# Patient Record
Sex: MALE | Race: WHITE | NOT HISPANIC OR LATINO | URBAN - METROPOLITAN AREA
[De-identification: names, ages, dates, MRNs, and addresses within clinical notes are randomized per-mention and may not be internally consistent; named-entity substitution may affect disease eponyms.]

---

## 2017-09-10 ENCOUNTER — EMERGENCY (EMERGENCY)
Facility: HOSPITAL | Age: 52
LOS: 0 days | Discharge: HOME | End: 2017-09-10

## 2017-09-10 DIAGNOSIS — Z87.442 PERSONAL HISTORY OF URINARY CALCULI: ICD-10-CM

## 2017-09-10 DIAGNOSIS — Z79.899 OTHER LONG TERM (CURRENT) DRUG THERAPY: ICD-10-CM

## 2017-09-10 DIAGNOSIS — N20.0 CALCULUS OF KIDNEY: ICD-10-CM

## 2017-09-10 DIAGNOSIS — F32.9 MAJOR DEPRESSIVE DISORDER, SINGLE EPISODE, UNSPECIFIED: ICD-10-CM

## 2017-09-10 DIAGNOSIS — N23 UNSPECIFIED RENAL COLIC: ICD-10-CM

## 2017-09-10 DIAGNOSIS — Z90.49 ACQUIRED ABSENCE OF OTHER SPECIFIED PARTS OF DIGESTIVE TRACT: ICD-10-CM

## 2017-09-10 DIAGNOSIS — Z90.5 ACQUIRED ABSENCE OF KIDNEY: ICD-10-CM

## 2017-09-10 DIAGNOSIS — R10.9 UNSPECIFIED ABDOMINAL PAIN: ICD-10-CM

## 2017-09-10 DIAGNOSIS — D49.519 NEOPLASM OF UNSPECIFIED BEHAVIOR OF UNSPECIFIED KIDNEY: ICD-10-CM

## 2017-10-03 ENCOUNTER — INPATIENT (INPATIENT)
Facility: HOSPITAL | Age: 52
LOS: 0 days | Discharge: HOME | End: 2017-10-04
Attending: UROLOGY | Admitting: FAMILY MEDICINE

## 2017-10-03 DIAGNOSIS — N23 UNSPECIFIED RENAL COLIC: ICD-10-CM

## 2017-10-03 DIAGNOSIS — D49.519 NEOPLASM OF UNSPECIFIED BEHAVIOR OF UNSPECIFIED KIDNEY: ICD-10-CM

## 2017-10-06 DIAGNOSIS — Z85.528 PERSONAL HISTORY OF OTHER MALIGNANT NEOPLASM OF KIDNEY: ICD-10-CM

## 2017-10-06 DIAGNOSIS — G47.33 OBSTRUCTIVE SLEEP APNEA (ADULT) (PEDIATRIC): ICD-10-CM

## 2017-10-06 DIAGNOSIS — N20.0 CALCULUS OF KIDNEY: ICD-10-CM

## 2017-10-06 DIAGNOSIS — N13.2 HYDRONEPHROSIS WITH RENAL AND URETERAL CALCULOUS OBSTRUCTION: ICD-10-CM

## 2017-10-06 DIAGNOSIS — F41.9 ANXIETY DISORDER, UNSPECIFIED: ICD-10-CM

## 2017-10-06 PROBLEM — Z00.00 ENCOUNTER FOR PREVENTIVE HEALTH EXAMINATION: Status: ACTIVE | Noted: 2017-10-06

## 2017-10-09 ENCOUNTER — APPOINTMENT (OUTPATIENT)
Dept: UROLOGY | Facility: CLINIC | Age: 52
End: 2017-10-09
Payer: COMMERCIAL

## 2017-10-09 VITALS
BODY MASS INDEX: 34.13 KG/M2 | DIASTOLIC BLOOD PRESSURE: 75 MMHG | WEIGHT: 295 LBS | SYSTOLIC BLOOD PRESSURE: 117 MMHG | HEART RATE: 73 BPM | HEIGHT: 78 IN

## 2017-10-09 DIAGNOSIS — Z83.3 FAMILY HISTORY OF DIABETES MELLITUS: ICD-10-CM

## 2017-10-09 DIAGNOSIS — Z80.3 FAMILY HISTORY OF MALIGNANT NEOPLASM OF BREAST: ICD-10-CM

## 2017-10-09 DIAGNOSIS — Z78.9 OTHER SPECIFIED HEALTH STATUS: ICD-10-CM

## 2017-10-09 PROCEDURE — 99213 OFFICE O/P EST LOW 20 MIN: CPT

## 2017-12-12 ENCOUNTER — APPOINTMENT (OUTPATIENT)
Dept: UROLOGY | Facility: CLINIC | Age: 52
End: 2017-12-12
Payer: COMMERCIAL

## 2017-12-12 VITALS
WEIGHT: 295 LBS | HEART RATE: 71 BPM | DIASTOLIC BLOOD PRESSURE: 86 MMHG | HEIGHT: 78 IN | BODY MASS INDEX: 34.13 KG/M2 | SYSTOLIC BLOOD PRESSURE: 137 MMHG

## 2017-12-12 PROCEDURE — 99213 OFFICE O/P EST LOW 20 MIN: CPT

## 2017-12-19 ENCOUNTER — APPOINTMENT (OUTPATIENT)
Dept: UROLOGY | Facility: CLINIC | Age: 52
End: 2017-12-19

## 2018-01-02 ENCOUNTER — OUTPATIENT (OUTPATIENT)
Dept: OUTPATIENT SERVICES | Facility: HOSPITAL | Age: 53
LOS: 1 days | Discharge: HOME | End: 2018-01-02

## 2018-01-02 DIAGNOSIS — N23 UNSPECIFIED RENAL COLIC: ICD-10-CM

## 2018-01-02 DIAGNOSIS — D49.519 NEOPLASM OF UNSPECIFIED BEHAVIOR OF UNSPECIFIED KIDNEY: ICD-10-CM

## 2018-01-02 DIAGNOSIS — C64.9 MALIGNANT NEOPLASM OF UNSPECIFIED KIDNEY, EXCEPT RENAL PELVIS: ICD-10-CM

## 2018-01-23 LAB
ANION GAP SERPL CALC-SCNC: 7 MEQ/L
BUN SERPL-MCNC: 14 MG/DL
BUN/CREAT SERPL: 10.7 %
CALCIUM SERPL-MCNC: 9.7 MG/DL
CHLORIDE SERPL-SCNC: 106 MEQ/L
CO2 SERPL-SCNC: 28 MEQ/L
CREAT SERPL-MCNC: 1.31 MG/DL
GFR SERPL CREATININE-BSD FRML MDRD: 58
GLUCOSE SERPL-MCNC: 115 MG/DL
POTASSIUM SERPL-SCNC: 4.9 MMOL/L
SODIUM SERPL-SCNC: 141 MEQ/L

## 2018-02-05 ENCOUNTER — OTHER (OUTPATIENT)
Age: 53
End: 2018-02-05

## 2018-05-04 ENCOUNTER — APPOINTMENT (OUTPATIENT)
Dept: UROLOGY | Facility: CLINIC | Age: 53
End: 2018-05-04

## 2018-12-27 ENCOUNTER — OTHER (OUTPATIENT)
Age: 53
End: 2018-12-27

## 2018-12-27 ENCOUNTER — INPATIENT (INPATIENT)
Facility: HOSPITAL | Age: 53
LOS: 0 days | Discharge: HOME | End: 2018-12-28
Attending: SURGERY | Admitting: SURGERY
Payer: COMMERCIAL

## 2018-12-27 VITALS
DIASTOLIC BLOOD PRESSURE: 87 MMHG | HEIGHT: 78 IN | WEIGHT: 294.98 LBS | HEART RATE: 70 BPM | SYSTOLIC BLOOD PRESSURE: 169 MMHG | OXYGEN SATURATION: 97 % | RESPIRATION RATE: 18 BRPM | TEMPERATURE: 96 F

## 2018-12-27 DIAGNOSIS — Z90.5 ACQUIRED ABSENCE OF KIDNEY: Chronic | ICD-10-CM

## 2018-12-27 DIAGNOSIS — Z90.49 ACQUIRED ABSENCE OF OTHER SPECIFIED PARTS OF DIGESTIVE TRACT: Chronic | ICD-10-CM

## 2018-12-27 DIAGNOSIS — Z98.890 OTHER SPECIFIED POSTPROCEDURAL STATES: Chronic | ICD-10-CM

## 2018-12-27 DIAGNOSIS — N20.0 CALCULUS OF KIDNEY: Chronic | ICD-10-CM

## 2018-12-27 DIAGNOSIS — Z85.528 PERSONAL HISTORY OF OTHER MALIGNANT NEOPLASM OF KIDNEY: Chronic | ICD-10-CM

## 2018-12-27 LAB
ALBUMIN SERPL ELPH-MCNC: 4.3 G/DL — SIGNIFICANT CHANGE UP (ref 3.5–5.2)
ALP SERPL-CCNC: 65 U/L — SIGNIFICANT CHANGE UP (ref 30–115)
ALT FLD-CCNC: 24 U/L — SIGNIFICANT CHANGE UP (ref 0–41)
ANION GAP SERPL CALC-SCNC: 11 MMOL/L — SIGNIFICANT CHANGE UP (ref 7–14)
APPEARANCE UR: CLEAR — SIGNIFICANT CHANGE UP
AST SERPL-CCNC: 18 U/L — SIGNIFICANT CHANGE UP (ref 0–41)
BASOPHILS # BLD AUTO: 0.03 K/UL — SIGNIFICANT CHANGE UP (ref 0–0.2)
BASOPHILS NFR BLD AUTO: 0.4 % — SIGNIFICANT CHANGE UP (ref 0–1)
BILIRUB DIRECT SERPL-MCNC: <0.2 MG/DL — SIGNIFICANT CHANGE UP (ref 0–0.2)
BILIRUB INDIRECT FLD-MCNC: >0.5 MG/DL — SIGNIFICANT CHANGE UP (ref 0.2–1.2)
BILIRUB SERPL-MCNC: 0.7 MG/DL — SIGNIFICANT CHANGE UP (ref 0.2–1.2)
BILIRUB UR-MCNC: NEGATIVE — SIGNIFICANT CHANGE UP
BUN SERPL-MCNC: 32 MG/DL — HIGH (ref 10–20)
CALCIUM SERPL-MCNC: 9.4 MG/DL — SIGNIFICANT CHANGE UP (ref 8.5–10.1)
CHLORIDE SERPL-SCNC: 104 MMOL/L — SIGNIFICANT CHANGE UP (ref 98–110)
CO2 SERPL-SCNC: 27 MMOL/L — SIGNIFICANT CHANGE UP (ref 17–32)
COLOR SPEC: YELLOW — SIGNIFICANT CHANGE UP
CREAT SERPL-MCNC: 2.3 MG/DL — HIGH (ref 0.7–1.5)
DIFF PNL FLD: ABNORMAL
EOSINOPHIL # BLD AUTO: 0.16 K/UL — SIGNIFICANT CHANGE UP (ref 0–0.7)
EOSINOPHIL NFR BLD AUTO: 1.9 % — SIGNIFICANT CHANGE UP (ref 0–8)
GLUCOSE SERPL-MCNC: 175 MG/DL — HIGH (ref 70–99)
GLUCOSE UR QL: NEGATIVE MG/DL — SIGNIFICANT CHANGE UP
HCT VFR BLD CALC: 43.4 % — SIGNIFICANT CHANGE UP (ref 42–52)
HGB BLD-MCNC: 14.5 G/DL — SIGNIFICANT CHANGE UP (ref 14–18)
IMM GRANULOCYTES NFR BLD AUTO: 0.2 % — SIGNIFICANT CHANGE UP (ref 0.1–0.3)
KETONES UR-MCNC: NEGATIVE — SIGNIFICANT CHANGE UP
LACTATE SERPL-SCNC: 0.9 MMOL/L — SIGNIFICANT CHANGE UP (ref 0.5–2.2)
LEUKOCYTE ESTERASE UR-ACNC: NEGATIVE — SIGNIFICANT CHANGE UP
LIDOCAIN IGE QN: 26 U/L — SIGNIFICANT CHANGE UP (ref 7–60)
LYMPHOCYTES # BLD AUTO: 1.71 K/UL — SIGNIFICANT CHANGE UP (ref 1.2–3.4)
LYMPHOCYTES # BLD AUTO: 20.6 % — SIGNIFICANT CHANGE UP (ref 20.5–51.1)
MCHC RBC-ENTMCNC: 29 PG — SIGNIFICANT CHANGE UP (ref 27–31)
MCHC RBC-ENTMCNC: 33.4 G/DL — SIGNIFICANT CHANGE UP (ref 32–37)
MCV RBC AUTO: 86.8 FL — SIGNIFICANT CHANGE UP (ref 80–94)
MONOCYTES # BLD AUTO: 0.85 K/UL — HIGH (ref 0.1–0.6)
MONOCYTES NFR BLD AUTO: 10.2 % — HIGH (ref 1.7–9.3)
NEUTROPHILS # BLD AUTO: 5.55 K/UL — SIGNIFICANT CHANGE UP (ref 1.4–6.5)
NEUTROPHILS NFR BLD AUTO: 66.7 % — SIGNIFICANT CHANGE UP (ref 42.2–75.2)
NITRITE UR-MCNC: NEGATIVE — SIGNIFICANT CHANGE UP
PH UR: 6 — SIGNIFICANT CHANGE UP (ref 5–8)
PLATELET # BLD AUTO: 201 K/UL — SIGNIFICANT CHANGE UP (ref 130–400)
POTASSIUM SERPL-MCNC: 4.3 MMOL/L — SIGNIFICANT CHANGE UP (ref 3.5–5)
POTASSIUM SERPL-SCNC: 4.3 MMOL/L — SIGNIFICANT CHANGE UP (ref 3.5–5)
PROT SERPL-MCNC: 7.3 G/DL — SIGNIFICANT CHANGE UP (ref 6–8)
PROT UR-MCNC: 30 MG/DL
RBC # BLD: 5 M/UL — SIGNIFICANT CHANGE UP (ref 4.7–6.1)
RBC # FLD: 12.5 % — SIGNIFICANT CHANGE UP (ref 11.5–14.5)
SODIUM SERPL-SCNC: 142 MMOL/L — SIGNIFICANT CHANGE UP (ref 135–146)
SP GR SPEC: >=1.03 (ref 1.01–1.03)
UROBILINOGEN FLD QL: 1 MG/DL (ref 0.2–0.2)
WBC # BLD: 8.32 K/UL — SIGNIFICANT CHANGE UP (ref 4.8–10.8)
WBC # FLD AUTO: 8.32 K/UL — SIGNIFICANT CHANGE UP (ref 4.8–10.8)

## 2018-12-27 PROCEDURE — 99222 1ST HOSP IP/OBS MODERATE 55: CPT | Mod: 25

## 2018-12-27 PROCEDURE — 52332 CYSTOSCOPY AND TREATMENT: CPT | Mod: LT

## 2018-12-27 RX ORDER — TAMSULOSIN HYDROCHLORIDE 0.4 MG/1
0.4 CAPSULE ORAL AT BEDTIME
Qty: 0 | Refills: 0 | Status: DISCONTINUED | OUTPATIENT
Start: 2018-12-27 | End: 2018-12-28

## 2018-12-27 RX ORDER — CEFAZOLIN SODIUM 1 G
1000 VIAL (EA) INJECTION EVERY 8 HOURS
Qty: 0 | Refills: 0 | Status: DISCONTINUED | OUTPATIENT
Start: 2018-12-27 | End: 2018-12-28

## 2018-12-27 RX ORDER — HYDROMORPHONE HYDROCHLORIDE 2 MG/ML
0.5 INJECTION INTRAMUSCULAR; INTRAVENOUS; SUBCUTANEOUS EVERY 4 HOURS
Qty: 0 | Refills: 0 | Status: DISCONTINUED | OUTPATIENT
Start: 2018-12-27 | End: 2018-12-27

## 2018-12-27 RX ORDER — HYDROMORPHONE HYDROCHLORIDE 2 MG/ML
0.5 INJECTION INTRAMUSCULAR; INTRAVENOUS; SUBCUTANEOUS ONCE
Qty: 0 | Refills: 0 | Status: DISCONTINUED | OUTPATIENT
Start: 2018-12-27 | End: 2018-12-27

## 2018-12-27 RX ORDER — SODIUM CHLORIDE 9 MG/ML
1000 INJECTION, SOLUTION INTRAVENOUS
Qty: 0 | Refills: 0 | Status: DISCONTINUED | OUTPATIENT
Start: 2018-12-27 | End: 2018-12-27

## 2018-12-27 RX ORDER — HYDROMORPHONE HYDROCHLORIDE 2 MG/ML
0.5 INJECTION INTRAMUSCULAR; INTRAVENOUS; SUBCUTANEOUS
Qty: 0 | Refills: 0 | Status: DISCONTINUED | OUTPATIENT
Start: 2018-12-27 | End: 2018-12-27

## 2018-12-27 RX ORDER — KETOROLAC TROMETHAMINE 30 MG/ML
15 SYRINGE (ML) INJECTION EVERY 6 HOURS
Qty: 0 | Refills: 0 | Status: DISCONTINUED | OUTPATIENT
Start: 2018-12-27 | End: 2018-12-28

## 2018-12-27 RX ORDER — HEPARIN SODIUM 5000 [USP'U]/ML
5000 INJECTION INTRAVENOUS; SUBCUTANEOUS EVERY 8 HOURS
Qty: 0 | Refills: 0 | Status: DISCONTINUED | OUTPATIENT
Start: 2018-12-27 | End: 2018-12-28

## 2018-12-27 RX ORDER — ONDANSETRON 8 MG/1
4 TABLET, FILM COATED ORAL ONCE
Qty: 0 | Refills: 0 | Status: DISCONTINUED | OUTPATIENT
Start: 2018-12-27 | End: 2018-12-27

## 2018-12-27 RX ORDER — MORPHINE SULFATE 50 MG/1
4 CAPSULE, EXTENDED RELEASE ORAL ONCE
Qty: 0 | Refills: 0 | Status: DISCONTINUED | OUTPATIENT
Start: 2018-12-27 | End: 2018-12-27

## 2018-12-27 RX ORDER — SODIUM CHLORIDE 9 MG/ML
2000 INJECTION, SOLUTION INTRAVENOUS ONCE
Qty: 0 | Refills: 0 | Status: COMPLETED | OUTPATIENT
Start: 2018-12-27 | End: 2018-12-27

## 2018-12-27 RX ORDER — ONDANSETRON 8 MG/1
4 TABLET, FILM COATED ORAL EVERY 6 HOURS
Qty: 0 | Refills: 0 | Status: DISCONTINUED | OUTPATIENT
Start: 2018-12-27 | End: 2018-12-27

## 2018-12-27 RX ORDER — SODIUM CHLORIDE 9 MG/ML
1000 INJECTION INTRAMUSCULAR; INTRAVENOUS; SUBCUTANEOUS
Qty: 0 | Refills: 0 | Status: DISCONTINUED | OUTPATIENT
Start: 2018-12-27 | End: 2018-12-27

## 2018-12-27 RX ORDER — ONDANSETRON 8 MG/1
4 TABLET, FILM COATED ORAL ONCE
Qty: 0 | Refills: 0 | Status: COMPLETED | OUTPATIENT
Start: 2018-12-27 | End: 2018-12-27

## 2018-12-27 RX ORDER — ACETAMINOPHEN 500 MG
650 TABLET ORAL EVERY 6 HOURS
Qty: 0 | Refills: 0 | Status: DISCONTINUED | OUTPATIENT
Start: 2018-12-27 | End: 2018-12-28

## 2018-12-27 RX ADMIN — SODIUM CHLORIDE 2000 MILLILITER(S): 9 INJECTION, SOLUTION INTRAVENOUS at 09:35

## 2018-12-27 RX ADMIN — SODIUM CHLORIDE 125 MILLILITER(S): 9 INJECTION INTRAMUSCULAR; INTRAVENOUS; SUBCUTANEOUS at 15:41

## 2018-12-27 RX ADMIN — Medication 100 MILLIGRAM(S): at 23:02

## 2018-12-27 RX ADMIN — TAMSULOSIN HYDROCHLORIDE 0.4 MILLIGRAM(S): 0.4 CAPSULE ORAL at 23:28

## 2018-12-27 RX ADMIN — ONDANSETRON 4 MILLIGRAM(S): 8 TABLET, FILM COATED ORAL at 09:35

## 2018-12-27 RX ADMIN — MORPHINE SULFATE 4 MILLIGRAM(S): 50 CAPSULE, EXTENDED RELEASE ORAL at 09:35

## 2018-12-27 RX ADMIN — HYDROMORPHONE HYDROCHLORIDE 0.5 MILLIGRAM(S): 2 INJECTION INTRAMUSCULAR; INTRAVENOUS; SUBCUTANEOUS at 15:40

## 2018-12-27 RX ADMIN — HEPARIN SODIUM 5000 UNIT(S): 5000 INJECTION INTRAVENOUS; SUBCUTANEOUS at 23:01

## 2018-12-27 RX ADMIN — SODIUM CHLORIDE 125 MILLILITER(S): 9 INJECTION INTRAMUSCULAR; INTRAVENOUS; SUBCUTANEOUS at 13:37

## 2018-12-27 RX ADMIN — HYDROMORPHONE HYDROCHLORIDE 0.5 MILLIGRAM(S): 2 INJECTION INTRAMUSCULAR; INTRAVENOUS; SUBCUTANEOUS at 14:41

## 2018-12-27 RX ADMIN — HYDROMORPHONE HYDROCHLORIDE 0.5 MILLIGRAM(S): 2 INJECTION INTRAMUSCULAR; INTRAVENOUS; SUBCUTANEOUS at 13:36

## 2018-12-27 RX ADMIN — SODIUM CHLORIDE 100 MILLILITER(S): 9 INJECTION, SOLUTION INTRAVENOUS at 18:56

## 2018-12-27 NOTE — H&P ADULT - ATTENDING COMMENTS
pt seen examined and x ray reviewed  larget left ureteral stone burden with advil 2 days ago  we will need to do w/u after d/c to try and avoid further stones pt seen examined and x ray reviewed  large (vs steinstrasse) left ureteral stone burden with advil 2 days ago  we will need to do w/u after d/c to try and avoid further stones

## 2018-12-27 NOTE — ED PROVIDER NOTE - MEDICAL DECISION MAKING DETAILS
53y male above PMH with left flank pain similar to prior colic, no fever, +chills with pain, one xam vital signs appreciated, well appearing, abd snt/nd, +mild left CVAT, labs and studies reviewed and d/w urology, will admit, stable for floor

## 2018-12-27 NOTE — H&P ADULT - NSHPPHYSICALEXAM_GEN_ALL_CORE
gen: non-toxic appearing  lungs; cta  cvs: s1s2  abd: soft, (+) left flank/LLQ tenderness  ext: no edema LE

## 2018-12-27 NOTE — H&P ADULT - PSH
H/O bilateral inguinal hernia repair    H/O partial nephrectomy  right  History of appendectomy    History of kidney cancer    Kidney stones

## 2018-12-27 NOTE — ED PROVIDER NOTE - NS ED ROS FT
Constitutional: No fevers/chills.    Head: No injury, headache.    Eyes:  No visual changes, eye pain or discharge. No injury.    ENMT:  No hearing changes, pain, discharge or infections. No neck pain or stiffness. No loss ROM.    Cardiac:  No chest pain, SOB or edema. No chest pain with exertion.    Respiratory:  No cough or respiratory distress.     GI:  No nausea, vomiting, diarrhea (+) abdominal pain. No anorexia. No change in PO intake.    :  (+) frequency, (-) difficulty urinating, urgency or burning. No change in urine output.    MS:  No leg pain, leg swelling, myalgia, muscle weakness, joint pain (+) left sided flank pain. No loss ROM.    Neuro:  No dizziness or weakness.  No LOC.    Skin:  No skin rash.

## 2018-12-27 NOTE — ED ADULT TRIAGE NOTE - CHIEF COMPLAINT QUOTE
Patient complaining of left lower flank pain that radiates to left side of abdomen 8/10 that started sunday.

## 2018-12-27 NOTE — H&P ADULT - NSHPLABSRESULTS_GEN_ALL_CORE
14.5   8.32  )-----------( 201      ( 27 Dec 2018 10:17 )             43.4       Auto Neutrophil %: 66.7 % (12-27-18 @ 10:17)  Auto Immature Granulocyte %: 0.2 % (12-27-18 @ 10:17)    12-27    142  |  104  |  32<H>  ----------------------------<  175<H>  4.3   |  27  |  2.3<H>      Calcium, Total Serum: 9.4 mg/dL (12-27-18 @ 10:17)      LFTs:             7.3  | 0.7  | 18       ------------------[65      ( 27 Dec 2018 10:17 )  4.3  | <0.2 | 24          Lipase:26     Amylase:x         Lactate, Blood: 0.9 mmol/L (12-27-18 @ 10:17)      Coags:        Urinalysis Basic - ( 27 Dec 2018 10:17 )    Color: Yellow / Appearance: Clear / SG: >=1.030 / pH: x  Gluc: x / Ketone: Negative  / Bili: Negative / Urobili: 1.0 mg/dL   Blood: x / Protein: 30 mg/dL / Nitrite: Negative   Leuk Esterase: Negative / RBC: 3-5 /HPF / WBC Negative   Sq Epi: x / Non Sq Epi: Few /HPF / Bacteria: x      RADIOLOGY:   CT Abdomen and Pelvis No Cont (12.27.18 @ 11:00) >      Moderate left hydronephrosis secondary to multiple stacked obstructing   calculi in the proximal left ureter, the largest individual calculus   measuring 10 x 5 mm.

## 2018-12-27 NOTE — BRIEF OPERATIVE NOTE - POST-OP DX
Hydronephrosis concurrent with and due to calculi of kidney and ureter  12/27/2018  left  Active  Antwon Rivers  Left ureteral calculus  12/27/2018    Active  Antwon Rivers  Pyonephrosis  12/27/2018  left  Active  Antwon Rivers  Renal calculus, left  12/27/2018    Active  Antwon Rivers

## 2018-12-27 NOTE — ED PROVIDER NOTE - OBJECTIVE STATEMENT
52 yo male with PMHx RCC s/p partial nephrectomy, kidney stones needing stents presents for left flank pain x 5 days. Patient states pain began in left flank now wrapping around to left groin. Patient denies fevers, chest pain, SOB, nausea, vomiting, diarrhea, constipation, dizziness, weakness, changes in PO intake, changes in urine output, changes in mental status.

## 2018-12-27 NOTE — H&P ADULT - HISTORY OF PRESENT ILLNESS
53 yr old male with c/o left flank pain, on & off, x 2 days. Denies fevers, chills, N/V, hematuria. He has hx of RCC, s/p partial right nephrectomy 3 yrs ago with Dr. Baca. CT A/P shows multiple left ureteral stones with hydronpehrosis and MAG (creat = 2.3) 53 yr old male with c/o left flank pain, on & off, x 2 days. Denies fevers, chills, N/V, hematuria. He has hx of RCC, s/p partial right nephrectomy 3 yrs ago with Dr. Baca and treatment of stones x at least 2. CT A/P shows multiple left ureteral stones, almost a steinstrasse with hydronpehrosis and MAG (creat = 2.3)

## 2018-12-27 NOTE — H&P ADULT - ASSESSMENT
53 yr old male with left flank pain x 2 days    1. MUltiple obstructing left ureteral calculi with moderate hydronpehrosis and MAG    Case D/W Dr. Rivers:   - Pt for OR today   - Keep NPO, IV fluids, pain management   - No antibiotics at this time   - strain urine

## 2018-12-27 NOTE — BRIEF OPERATIVE NOTE - PRE-OP DX
Hydronephrosis concurrent with and due to calculi of kidney and ureter  12/27/2018  left  Active  Antwon Rivers  Left ureteral calculus  12/27/2018    Active  Antwon Rivers  Renal calculus, left  12/27/2018    Active  Antwon Rivers

## 2018-12-27 NOTE — BRIEF OPERATIVE NOTE - PROCEDURE
<<-----Click on this checkbox to enter Procedure Ureteral stent placement, intraoperatively (not at tx)  12/27/2018  left  Active  ELDA  Cystoscopy with left retrograde pyelography  12/27/2018    Active  ELDA

## 2018-12-28 ENCOUNTER — TRANSCRIPTION ENCOUNTER (OUTPATIENT)
Age: 53
End: 2018-12-28

## 2018-12-28 VITALS
TEMPERATURE: 98 F | HEART RATE: 88 BPM | DIASTOLIC BLOOD PRESSURE: 71 MMHG | RESPIRATION RATE: 16 BRPM | SYSTOLIC BLOOD PRESSURE: 157 MMHG

## 2018-12-28 LAB
ANION GAP SERPL CALC-SCNC: 7 MMOL/L — SIGNIFICANT CHANGE UP (ref 7–14)
BASOPHILS # BLD AUTO: 0.01 K/UL — SIGNIFICANT CHANGE UP (ref 0–0.2)
BASOPHILS NFR BLD AUTO: 0.1 % — SIGNIFICANT CHANGE UP (ref 0–1)
BUN SERPL-MCNC: 28 MG/DL — HIGH (ref 10–20)
CALCIUM SERPL-MCNC: 9.1 MG/DL — SIGNIFICANT CHANGE UP (ref 8.5–10.1)
CHLORIDE SERPL-SCNC: 102 MMOL/L — SIGNIFICANT CHANGE UP (ref 98–110)
CO2 SERPL-SCNC: 29 MMOL/L — SIGNIFICANT CHANGE UP (ref 17–32)
CREAT SERPL-MCNC: 1.8 MG/DL — HIGH (ref 0.7–1.5)
CULTURE RESULTS: NO GROWTH — SIGNIFICANT CHANGE UP
EOSINOPHIL # BLD AUTO: 0 K/UL — SIGNIFICANT CHANGE UP (ref 0–0.7)
EOSINOPHIL NFR BLD AUTO: 0 % — SIGNIFICANT CHANGE UP (ref 0–8)
GLUCOSE SERPL-MCNC: 148 MG/DL — HIGH (ref 70–99)
HCT VFR BLD CALC: 37.6 % — LOW (ref 42–52)
HGB BLD-MCNC: 12.6 G/DL — LOW (ref 14–18)
IMM GRANULOCYTES NFR BLD AUTO: 0.4 % — HIGH (ref 0.1–0.3)
LYMPHOCYTES # BLD AUTO: 0.98 K/UL — LOW (ref 1.2–3.4)
LYMPHOCYTES # BLD AUTO: 13.1 % — LOW (ref 20.5–51.1)
MCHC RBC-ENTMCNC: 29.2 PG — SIGNIFICANT CHANGE UP (ref 27–31)
MCHC RBC-ENTMCNC: 33.5 G/DL — SIGNIFICANT CHANGE UP (ref 32–37)
MCV RBC AUTO: 87.2 FL — SIGNIFICANT CHANGE UP (ref 80–94)
MONOCYTES # BLD AUTO: 0.51 K/UL — SIGNIFICANT CHANGE UP (ref 0.1–0.6)
MONOCYTES NFR BLD AUTO: 6.8 % — SIGNIFICANT CHANGE UP (ref 1.7–9.3)
NEUTROPHILS # BLD AUTO: 5.96 K/UL — SIGNIFICANT CHANGE UP (ref 1.4–6.5)
NEUTROPHILS NFR BLD AUTO: 79.6 % — HIGH (ref 42.2–75.2)
NRBC # BLD: 0 /100 WBCS — SIGNIFICANT CHANGE UP (ref 0–0)
PLATELET # BLD AUTO: 204 K/UL — SIGNIFICANT CHANGE UP (ref 130–400)
POTASSIUM SERPL-MCNC: 4.9 MMOL/L — SIGNIFICANT CHANGE UP (ref 3.5–5)
POTASSIUM SERPL-SCNC: 4.9 MMOL/L — SIGNIFICANT CHANGE UP (ref 3.5–5)
RBC # BLD: 4.31 M/UL — LOW (ref 4.7–6.1)
RBC # FLD: 12.2 % — SIGNIFICANT CHANGE UP (ref 11.5–14.5)
SODIUM SERPL-SCNC: 138 MMOL/L — SIGNIFICANT CHANGE UP (ref 135–146)
SPECIMEN SOURCE: SIGNIFICANT CHANGE UP
WBC # BLD: 7.49 K/UL — SIGNIFICANT CHANGE UP (ref 4.8–10.8)
WBC # FLD AUTO: 7.49 K/UL — SIGNIFICANT CHANGE UP (ref 4.8–10.8)

## 2018-12-28 RX ORDER — TAMSULOSIN HYDROCHLORIDE 0.4 MG/1
1 CAPSULE ORAL
Qty: 0 | Refills: 0 | DISCHARGE
Start: 2018-12-28

## 2018-12-28 RX ORDER — CEFUROXIME AXETIL 250 MG
1 TABLET ORAL
Qty: 14 | Refills: 0
Start: 2018-12-28 | End: 2019-01-03

## 2018-12-28 RX ORDER — TAMSULOSIN HYDROCHLORIDE 0.4 MG/1
1 CAPSULE ORAL
Qty: 30 | Refills: 0 | OUTPATIENT
Start: 2018-12-28

## 2018-12-28 RX ORDER — TAMSULOSIN HYDROCHLORIDE 0.4 MG/1
1 CAPSULE ORAL
Qty: 0 | Refills: 0 | COMMUNITY
Start: 2018-12-28

## 2018-12-28 RX ADMIN — Medication 100 MILLIGRAM(S): at 06:46

## 2018-12-28 RX ADMIN — HEPARIN SODIUM 5000 UNIT(S): 5000 INJECTION INTRAVENOUS; SUBCUTANEOUS at 06:45

## 2018-12-28 NOTE — DISCHARGE NOTE ADULT - CARE PROVIDER_API CALL
Antwon Rivers), Urology  [address] [place] [address]  [address]  [place] [address]  Phone: [phone]  Fax: [phone]

## 2018-12-28 NOTE — DISCHARGE NOTE ADULT - MEDICATION SUMMARY - MEDICATIONS TO TAKE
I will START or STAY ON the medications listed below when I get home from the hospital:    tamsulosin 0.4 mg oral capsule  -- 1 cap(s) by mouth once a day (at bedtime)  -- Indication: For stent pain    cefuroxime 500 mg oral tablet  -- 1 tab(s) by mouth every 12 hours   -- Finish all this medication unless otherwise directed by prescriber.  Medication should be taken with plenty of water.  Take with food or milk.    -- Indication: For antibiotic

## 2018-12-28 NOTE — DISCHARGE NOTE ADULT - HOSPITAL COURSE
53 yr old male with c/o left flank pain, on & off, x 2 days. Denies fevers, chills, N/V, hematuria. He has hx of RCC, s/p partial right nephrectomy 3 yrs ago with Dr. Baca and treatment of stones x at least 2. CT A/P shows multiple left ureteral stones, almost a steinstrasse with hydronpehrosis and MAG (creat = 2.3) (27 Dec 2018 13:14) He proceeded to OR for left stent placement, found to have pyuria and remained overnight for IV antibiotic and cook .  He had cook removed the following day, (+) void; his creatinine also improved (2.3 -- 1.8)

## 2018-12-28 NOTE — DISCHARGE NOTE ADULT - CARE PLAN
Principal Discharge DX:	Kidney stones  Goal:	stent and stone removal  Assessment and plan of treatment:	follow up in office in 1 week for scheduling of additional surgery

## 2018-12-28 NOTE — DISCHARGE NOTE ADULT - PATIENT PORTAL LINK FT
You can access the [facility] Patient Portal, offered by [facility], by registering with the following website: [email or web address]

## 2018-12-28 NOTE — PROGRESS NOTE ADULT - SUBJECTIVE AND OBJECTIVE BOX
S; Pt feeling better; denies flank pain. Mckinley in place, patent. Afebrile overnight  O:Vital Signs Last 24 Hrs  T(C): 35.8 (28 Dec 2018 09:00), Max: 36.9 (27 Dec 2018 15:26)  T(F): 96.5 (28 Dec 2018 09:00), Max: 98.4 (27 Dec 2018 15:26)  HR: 62 (28 Dec 2018 09:00) (50 - 85)  BP: 119/58 (28 Dec 2018 09:00) (119/58 - 183/94)  BP(mean): --  RR: 16 (28 Dec 2018 09:00) (14 - 20)  SpO2: 95% (27 Dec 2018 19:45) (95% - 98%)    EXAM:  gen: non toxic appearing  lungs: cta  cvs: s1s2  abd: soft NT/ND  ext: no edema    LABS:                        12.6   7.49  )-----------( 204      ( 28 Dec 2018 07:51 )             37.6     12-28    138  |  102  |  28<H>  ----------------------------<  148<H>  4.9   |  29  |  1.8<H>    Ca    9.1      28 Dec 2018 07:51    TPro  7.3  /  Alb  4.3  /  TBili  0.7  /  DBili  <0.2  /  AST  18  /  ALT  24  /  AlkPhos  65  12-27    Creatinine: 1.8 (12-28 @ 07:51)    Creatinine: 2.3 (12-27 @ 10:17)

## 2018-12-31 ENCOUNTER — APPOINTMENT (OUTPATIENT)
Dept: UROLOGY | Facility: CLINIC | Age: 53
End: 2018-12-31
Payer: COMMERCIAL

## 2018-12-31 VITALS
HEIGHT: 78 IN | SYSTOLIC BLOOD PRESSURE: 145 MMHG | BODY MASS INDEX: 34.13 KG/M2 | DIASTOLIC BLOOD PRESSURE: 89 MMHG | HEART RATE: 83 BPM | WEIGHT: 295 LBS

## 2018-12-31 DIAGNOSIS — K35.32 ACUTE APPENDICITIS W/ PERFORATION AND LOCALIZED PERITONITIS, W/O ABSCESS: ICD-10-CM

## 2018-12-31 PROCEDURE — 99214 OFFICE O/P EST MOD 30 MIN: CPT

## 2018-12-31 NOTE — LETTER HEADER
[FreeTextEntry3] : Antwon Rivers M.D.\par Director of Urology\par [facility]\par [address] [place] [address], [address]\[place] [address]

## 2018-12-31 NOTE — HISTORY OF PRESENT ILLNESS
[FreeTextEntry1] : Dieter was seen in the hospital on December 27 in the evening. At that time I put up a double-J because we found cloudy urine. The cultures came back as no growth, he had been discharged on Friday the question is what can we do to make them stone free. [None] : no symptoms

## 2018-12-31 NOTE — LETTER BODY
[Dear  ___] : Dear  [unfilled], [Courtesy Letter:] : I had the pleasure of seeing your patient, [unfilled], in my office today. [Please see my note below.] : Please see my note below. [Sincerely,] : Sincerely, [FreeTextEntry2] : Preston Jorge MD\par [address] [facility]\[place] [address]\par

## 2019-01-01 ENCOUNTER — FORM ENCOUNTER (OUTPATIENT)
Age: 54
End: 2019-01-01

## 2019-01-02 ENCOUNTER — OUTPATIENT (OUTPATIENT)
Dept: OUTPATIENT SERVICES | Facility: HOSPITAL | Age: 54
LOS: 1 days | Discharge: HOME | End: 2019-01-02

## 2019-01-02 VITALS
SYSTOLIC BLOOD PRESSURE: 155 MMHG | HEART RATE: 74 BPM | DIASTOLIC BLOOD PRESSURE: 86 MMHG | HEIGHT: 78 IN | OXYGEN SATURATION: 100 % | WEIGHT: 297.62 LBS | TEMPERATURE: 98 F | RESPIRATION RATE: 18 BRPM

## 2019-01-02 DIAGNOSIS — Z85.528 PERSONAL HISTORY OF OTHER MALIGNANT NEOPLASM OF KIDNEY: ICD-10-CM

## 2019-01-02 DIAGNOSIS — N20.0 CALCULUS OF KIDNEY: Chronic | ICD-10-CM

## 2019-01-02 DIAGNOSIS — Z90.49 ACQUIRED ABSENCE OF OTHER SPECIFIED PARTS OF DIGESTIVE TRACT: ICD-10-CM

## 2019-01-02 DIAGNOSIS — N32.89 OTHER SPECIFIED DISORDERS OF BLADDER: ICD-10-CM

## 2019-01-02 DIAGNOSIS — Z90.5 ACQUIRED ABSENCE OF KIDNEY: Chronic | ICD-10-CM

## 2019-01-02 DIAGNOSIS — N17.9 ACUTE KIDNEY FAILURE, UNSPECIFIED: ICD-10-CM

## 2019-01-02 DIAGNOSIS — Z90.5 ACQUIRED ABSENCE OF KIDNEY: ICD-10-CM

## 2019-01-02 DIAGNOSIS — Z85.528 PERSONAL HISTORY OF OTHER MALIGNANT NEOPLASM OF KIDNEY: Chronic | ICD-10-CM

## 2019-01-02 DIAGNOSIS — N20.0 CALCULUS OF KIDNEY: ICD-10-CM

## 2019-01-02 DIAGNOSIS — N13.6 PYONEPHROSIS: ICD-10-CM

## 2019-01-02 DIAGNOSIS — Z98.890 OTHER SPECIFIED POSTPROCEDURAL STATES: Chronic | ICD-10-CM

## 2019-01-02 DIAGNOSIS — Z01.818 ENCOUNTER FOR OTHER PREPROCEDURAL EXAMINATION: ICD-10-CM

## 2019-01-02 DIAGNOSIS — N40.1 BENIGN PROSTATIC HYPERPLASIA WITH LOWER URINARY TRACT SYMPTOMS: ICD-10-CM

## 2019-01-02 DIAGNOSIS — Z90.49 ACQUIRED ABSENCE OF OTHER SPECIFIED PARTS OF DIGESTIVE TRACT: Chronic | ICD-10-CM

## 2019-01-02 DIAGNOSIS — Z87.442 PERSONAL HISTORY OF URINARY CALCULI: ICD-10-CM

## 2019-01-02 LAB
ALBUMIN SERPL ELPH-MCNC: 4.7 G/DL — SIGNIFICANT CHANGE UP (ref 3.5–5.2)
ALP SERPL-CCNC: 67 U/L — SIGNIFICANT CHANGE UP (ref 30–115)
ALT FLD-CCNC: 38 U/L — SIGNIFICANT CHANGE UP (ref 0–41)
ANION GAP SERPL CALC-SCNC: 18 MMOL/L — HIGH (ref 7–14)
APPEARANCE UR: ABNORMAL
APTT BLD: 32.6 SEC — SIGNIFICANT CHANGE UP (ref 27–39.2)
AST SERPL-CCNC: 20 U/L — SIGNIFICANT CHANGE UP (ref 0–41)
BACTERIA # UR AUTO: ABNORMAL /HPF
BASOPHILS # BLD AUTO: 0.04 K/UL — SIGNIFICANT CHANGE UP (ref 0–0.2)
BASOPHILS NFR BLD AUTO: 0.7 % — SIGNIFICANT CHANGE UP (ref 0–1)
BILIRUB SERPL-MCNC: 0.5 MG/DL — SIGNIFICANT CHANGE UP (ref 0.2–1.2)
BILIRUB UR-MCNC: NEGATIVE — SIGNIFICANT CHANGE UP
BUN SERPL-MCNC: 23 MG/DL — HIGH (ref 10–20)
CALCIUM SERPL-MCNC: 9.2 MG/DL — SIGNIFICANT CHANGE UP (ref 8.5–10.1)
CHLORIDE SERPL-SCNC: 99 MMOL/L — SIGNIFICANT CHANGE UP (ref 98–110)
CO2 SERPL-SCNC: 24 MMOL/L — SIGNIFICANT CHANGE UP (ref 17–32)
COLOR SPEC: YELLOW — SIGNIFICANT CHANGE UP
CREAT SERPL-MCNC: 1.1 MG/DL — SIGNIFICANT CHANGE UP (ref 0.7–1.5)
DIFF PNL FLD: ABNORMAL
EOSINOPHIL # BLD AUTO: 0.07 K/UL — SIGNIFICANT CHANGE UP (ref 0–0.7)
EOSINOPHIL NFR BLD AUTO: 1.2 % — SIGNIFICANT CHANGE UP (ref 0–8)
EPI CELLS # UR: ABNORMAL /HPF
GLUCOSE SERPL-MCNC: 97 MG/DL — SIGNIFICANT CHANGE UP (ref 70–99)
GLUCOSE UR QL: NEGATIVE MG/DL — SIGNIFICANT CHANGE UP
HCT VFR BLD CALC: 43 % — SIGNIFICANT CHANGE UP (ref 42–52)
HGB BLD-MCNC: 14.6 G/DL — SIGNIFICANT CHANGE UP (ref 14–18)
IMM GRANULOCYTES NFR BLD AUTO: 0.7 % — HIGH (ref 0.1–0.3)
INR BLD: 1.1 RATIO — SIGNIFICANT CHANGE UP (ref 0.65–1.3)
KETONES UR-MCNC: NEGATIVE — SIGNIFICANT CHANGE UP
LEUKOCYTE ESTERASE UR-ACNC: ABNORMAL
LYMPHOCYTES # BLD AUTO: 1.71 K/UL — SIGNIFICANT CHANGE UP (ref 1.2–3.4)
LYMPHOCYTES # BLD AUTO: 28.7 % — SIGNIFICANT CHANGE UP (ref 20.5–51.1)
MCHC RBC-ENTMCNC: 29.5 PG — SIGNIFICANT CHANGE UP (ref 27–31)
MCHC RBC-ENTMCNC: 34 G/DL — SIGNIFICANT CHANGE UP (ref 32–37)
MCV RBC AUTO: 86.9 FL — SIGNIFICANT CHANGE UP (ref 80–94)
MONOCYTES # BLD AUTO: 0.42 K/UL — SIGNIFICANT CHANGE UP (ref 0.1–0.6)
MONOCYTES NFR BLD AUTO: 7 % — SIGNIFICANT CHANGE UP (ref 1.7–9.3)
NEUTROPHILS # BLD AUTO: 3.68 K/UL — SIGNIFICANT CHANGE UP (ref 1.4–6.5)
NEUTROPHILS NFR BLD AUTO: 61.7 % — SIGNIFICANT CHANGE UP (ref 42.2–75.2)
NITRITE UR-MCNC: NEGATIVE — SIGNIFICANT CHANGE UP
NRBC # BLD: 0 /100 WBCS — SIGNIFICANT CHANGE UP (ref 0–0)
PH UR: 5.5 — SIGNIFICANT CHANGE UP (ref 5–8)
PLATELET # BLD AUTO: 282 K/UL — SIGNIFICANT CHANGE UP (ref 130–400)
POTASSIUM SERPL-MCNC: 4.2 MMOL/L — SIGNIFICANT CHANGE UP (ref 3.5–5)
POTASSIUM SERPL-SCNC: 4.2 MMOL/L — SIGNIFICANT CHANGE UP (ref 3.5–5)
PROT SERPL-MCNC: 7.8 G/DL — SIGNIFICANT CHANGE UP (ref 6–8)
PROT UR-MCNC: 100 MG/DL
PROTHROM AB SERPL-ACNC: 12.6 SEC — SIGNIFICANT CHANGE UP (ref 9.95–12.87)
RBC # BLD: 4.95 M/UL — SIGNIFICANT CHANGE UP (ref 4.7–6.1)
RBC # FLD: 12.5 % — SIGNIFICANT CHANGE UP (ref 11.5–14.5)
RBC CASTS # UR COMP ASSIST: ABNORMAL /HPF
SODIUM SERPL-SCNC: 141 MMOL/L — SIGNIFICANT CHANGE UP (ref 135–146)
SP GR SPEC: 1.02 — SIGNIFICANT CHANGE UP (ref 1.01–1.03)
UROBILINOGEN FLD QL: 0.2 MG/DL — SIGNIFICANT CHANGE UP (ref 0.2–0.2)
WBC # BLD: 5.96 K/UL — SIGNIFICANT CHANGE UP (ref 4.8–10.8)
WBC # FLD AUTO: 5.96 K/UL — SIGNIFICANT CHANGE UP (ref 4.8–10.8)
WBC UR QL: SIGNIFICANT CHANGE UP /HPF

## 2019-01-02 NOTE — H&P PST ADULT - REASON FOR ADMISSION
52 Y/O M  SCHEDULED FOR PAST FOR CYSTOSCOPY LEFT URETEROSCOPY LASER LITHOTRIPSY STONE BASKETING POSSIBLE REMOVAL AND REPLACEMENT OF JJ STENT ON 1/8/19

## 2019-01-03 LAB
CULTURE RESULTS: NO GROWTH — SIGNIFICANT CHANGE UP
SPECIMEN SOURCE: SIGNIFICANT CHANGE UP

## 2019-01-08 ENCOUNTER — OUTPATIENT (OUTPATIENT)
Dept: OUTPATIENT SERVICES | Facility: HOSPITAL | Age: 54
LOS: 1 days | Discharge: HOME | End: 2019-01-08

## 2019-01-08 ENCOUNTER — APPOINTMENT (OUTPATIENT)
Dept: UROLOGY | Facility: HOSPITAL | Age: 54
End: 2019-01-08
Payer: COMMERCIAL

## 2019-01-08 VITALS
RESPIRATION RATE: 17 BRPM | TEMPERATURE: 97 F | DIASTOLIC BLOOD PRESSURE: 81 MMHG | HEART RATE: 71 BPM | OXYGEN SATURATION: 97 % | SYSTOLIC BLOOD PRESSURE: 138 MMHG | HEIGHT: 78 IN | WEIGHT: 289.91 LBS

## 2019-01-08 VITALS — SYSTOLIC BLOOD PRESSURE: 145 MMHG | RESPIRATION RATE: 16 BRPM | HEART RATE: 78 BPM | DIASTOLIC BLOOD PRESSURE: 89 MMHG

## 2019-01-08 DIAGNOSIS — Z90.49 ACQUIRED ABSENCE OF OTHER SPECIFIED PARTS OF DIGESTIVE TRACT: Chronic | ICD-10-CM

## 2019-01-08 DIAGNOSIS — Z85.528 PERSONAL HISTORY OF OTHER MALIGNANT NEOPLASM OF KIDNEY: Chronic | ICD-10-CM

## 2019-01-08 DIAGNOSIS — N20.0 CALCULUS OF KIDNEY: Chronic | ICD-10-CM

## 2019-01-08 DIAGNOSIS — Z98.890 OTHER SPECIFIED POSTPROCEDURAL STATES: Chronic | ICD-10-CM

## 2019-01-08 DIAGNOSIS — Z90.5 ACQUIRED ABSENCE OF KIDNEY: Chronic | ICD-10-CM

## 2019-01-08 PROCEDURE — 52352 CYSTOURETERO W/STONE REMOVE: CPT | Mod: LT,59

## 2019-01-08 PROCEDURE — 52356 CYSTO/URETERO W/LITHOTRIPSY: CPT | Mod: LT

## 2019-01-08 RX ORDER — ONDANSETRON 8 MG/1
4 TABLET, FILM COATED ORAL ONCE
Qty: 0 | Refills: 0 | Status: DISCONTINUED | OUTPATIENT
Start: 2019-01-08 | End: 2019-01-23

## 2019-01-08 RX ORDER — ACETAMINOPHEN 500 MG
650 TABLET ORAL ONCE
Qty: 0 | Refills: 0 | Status: COMPLETED | OUTPATIENT
Start: 2019-01-08 | End: 2019-01-08

## 2019-01-08 RX ORDER — SODIUM CHLORIDE 9 MG/ML
1000 INJECTION, SOLUTION INTRAVENOUS
Qty: 0 | Refills: 0 | Status: DISCONTINUED | OUTPATIENT
Start: 2019-01-08 | End: 2019-01-23

## 2019-01-08 RX ORDER — MORPHINE SULFATE 50 MG/1
2 CAPSULE, EXTENDED RELEASE ORAL
Qty: 0 | Refills: 0 | Status: DISCONTINUED | OUTPATIENT
Start: 2019-01-08 | End: 2019-01-08

## 2019-01-08 RX ORDER — OXYCODONE AND ACETAMINOPHEN 5; 325 MG/1; MG/1
1 TABLET ORAL EVERY 4 HOURS
Qty: 0 | Refills: 0 | Status: DISCONTINUED | OUTPATIENT
Start: 2019-01-08 | End: 2019-01-08

## 2019-01-08 RX ADMIN — Medication 650 MILLIGRAM(S): at 21:27

## 2019-01-08 RX ADMIN — SODIUM CHLORIDE 100 MILLILITER(S): 9 INJECTION, SOLUTION INTRAVENOUS at 21:18

## 2019-01-08 NOTE — BRIEF OPERATIVE NOTE - OPERATION/FINDINGS
6/28  [id number] /[id number] / 2022-11-17.    URETERAL STONES PULLED OUT WITH jj AND FRAGMENTS RECOVERED MOST OF THEM BROKE UP IN TO PIECES, SOME WENT IN TO THE KIDNEY AND WERE FOUND WITH THE PRIOR RENAL STONE ALL IN THE LOWER POLE THEY WERE BROKEN UP WITH A 200 MICRON FIBER 0.2/5310.6W AND SOME 1/20 4.8W. TOTAL = 3.58 KJ.

## 2019-01-08 NOTE — BRIEF OPERATIVE NOTE - POST-OP DX
Left ureteral calculus  01/08/2019    Active  Antwon Rivers  Nephrolithiasis  01/08/2019  left  Active  Antwon Rivers

## 2019-01-08 NOTE — BRIEF OPERATIVE NOTE - PRE-OP DX
Left ureteral calculus  01/08/2019    Active  Antwon Rivers  Nephrolithiasis  01/08/2019  left lower pole  Active  Antwon Rivers

## 2019-01-08 NOTE — BRIEF OPERATIVE NOTE - PROCEDURE
<<-----Click on this checkbox to enter Procedure Ureteral stent change  01/08/2019  LEFT  Active  ELDA  Ureteroscopy with laser lithotripsy and stent placement  01/08/2019  LEFT URETERAL STONE MANIPULATION, LEFT RENAL STONE LASER AND BASKETING  Active  ELDA

## 2019-01-10 PROBLEM — Z85.528 PERSONAL HISTORY OF OTHER MALIGNANT NEOPLASM OF KIDNEY: Chronic | Status: ACTIVE | Noted: 2018-12-27

## 2019-01-10 PROBLEM — N20.0 CALCULUS OF KIDNEY: Chronic | Status: ACTIVE | Noted: 2019-01-02

## 2019-01-13 DIAGNOSIS — G47.33 OBSTRUCTIVE SLEEP APNEA (ADULT) (PEDIATRIC): ICD-10-CM

## 2019-01-13 DIAGNOSIS — Z85.528 PERSONAL HISTORY OF OTHER MALIGNANT NEOPLASM OF KIDNEY: ICD-10-CM

## 2019-01-13 DIAGNOSIS — N20.2 CALCULUS OF KIDNEY WITH CALCULUS OF URETER: ICD-10-CM

## 2019-01-14 ENCOUNTER — APPOINTMENT (OUTPATIENT)
Dept: UROLOGY | Facility: CLINIC | Age: 54
End: 2019-01-14
Payer: COMMERCIAL

## 2019-01-14 LAB
COMPN STONE: SIGNIFICANT CHANGE UP
COMPN STONE: SIGNIFICANT CHANGE UP

## 2019-01-14 PROCEDURE — 81003 URINALYSIS AUTO W/O SCOPE: CPT | Mod: QW

## 2019-01-14 PROCEDURE — 52310 CYSTOSCOPY AND TREATMENT: CPT

## 2019-01-14 PROCEDURE — 99214 OFFICE O/P EST MOD 30 MIN: CPT | Mod: 25

## 2019-01-14 NOTE — ASSESSMENT
[FreeTextEntry1] : Physical exam shows no signs of dish right CVA tenderness. The urinalysis showed no signs of infection. I tried to get the double-J out with the snare but I couldn’t engage it. I therefore use a flexible cystoscope, please see that note separately or is able to grab the string and pull the stent out intact.\par \par The main issue here is prevention. Whether the stones that he has in now will grow and/or fall out and bother him remain to be seen but the remaining stones were smaller than the width of the double-J so hopefully if they fall out that will fall out sooner than later. He will take Macrobid for the next few days and in the meantime he will give him about six weeks and then get two metabolic workups one on a weekday one on the weekend. He is also been asked to see a nephrologist seizing him of the list of the ones that I work with most often but he will speak to his PCP and see who his PCP works with.\par \par With respect to his voiding issues while give him again about six weeks to get over the amount I just tortured him, he will then keep a record of his intake and output, will have him come in and consider a flow study with the post void.\par

## 2019-01-14 NOTE — HISTORY OF PRESENT ILLNESS
[Urinary Urgency] : urinary urgency [Urinary Frequency] : urinary frequency [Nocturia] : nocturia [Straining] : straining [FreeTextEntry1] : Six-day status post left ureteroscopy with laser lithotripsy and stone basketing and manipulation a combination of ureteral and renal stones. Most of the renal stones what turned into dust. There was one that was broken partially but I cannot fully get and it wouldn’t go into the basket. It was small enough to pass hopefully it will not grow. Dieter has not passed any fragments he’s been able to get we did take at two tiny fragments from the OR and hopefully there were enough for them to do an analysis. Visually the stones looked like our majority of uric acid and they were not showing on the floor scope i.e. ionizing radiation does not visualize the stones.\par \par He’s been feeling okay though he does acknowledge that even before we started putting in the double-J’s etc. he was having increasing urinary difficulties with urgency frequency incomplete emptying and it is possible that he was auto dehydrating leading to an increased propensity for the stones not just the issue of him stopping the medication that he had been on for a while that had prevented the stones i.e. potassium citrate.\par

## 2019-01-14 NOTE — PHYSICAL EXAM
[General Appearance - Well Developed] : well developed [General Appearance - Well Nourished] : well nourished [Normal Appearance] : normal appearance [Well Groomed] : well groomed [General Appearance - In No Acute Distress] : no acute distress [Abdomen Soft] : soft [Abdomen Tenderness] : non-tender [Costovertebral Angle Tenderness] : no ~M costovertebral angle tenderness [Urethral Meatus] : meatus normal [Epididymis] : the epididymides were normal [Testes Tenderness] : no tenderness of the testes [Testes Mass (___cm)] : there were no testicular masses [Heart Rate And Rhythm] : Heart rate and rhythm were normal [Edema] : no peripheral edema [] : no respiratory distress [Respiration, Rhythm And Depth] : normal respiratory rhythm and effort [Exaggerated Use Of Accessory Muscles For Inspiration] : no accessory muscle use [Oriented To Time, Place, And Person] : oriented to person, place, and time [Affect] : the affect was normal [Mood] : the mood was normal [Not Anxious] : not anxious [Normal Station and Gait] : the gait and station were normal for the patient's age

## 2019-01-14 NOTE — LETTER BODY
[Dear  ___] : Dear  [unfilled], [Courtesy Letter:] : I had the pleasure of seeing your patient, [unfilled], in my office today. [Please see my note below.] : Please see my note below. [Sincerely,] : Sincerely, [FreeTextEntry2] : Preston Jorge MD\par [address] [facility]\[place] [address]\par

## 2019-01-14 NOTE — LETTER HEADER
[FreeTextEntry3] : Antwon Rivers M.D.\par Director of Urology\par [facility]\par [address] [place] [address], [address]\[place] [address]

## 2019-03-11 ENCOUNTER — APPOINTMENT (OUTPATIENT)
Dept: UROLOGY | Facility: CLINIC | Age: 54
End: 2019-03-11
Payer: COMMERCIAL

## 2019-03-11 VITALS
BODY MASS INDEX: 34.13 KG/M2 | SYSTOLIC BLOOD PRESSURE: 158 MMHG | DIASTOLIC BLOOD PRESSURE: 90 MMHG | WEIGHT: 295 LBS | HEIGHT: 78 IN | HEART RATE: 67 BPM

## 2019-03-11 DIAGNOSIS — R35.1 NOCTURIA: ICD-10-CM

## 2019-03-11 DIAGNOSIS — N13.30 UNSPECIFIED HYDRONEPHROSIS: ICD-10-CM

## 2019-03-11 DIAGNOSIS — N20.1 CALCULUS OF URETER: ICD-10-CM

## 2019-03-11 DIAGNOSIS — N20.0 CALCULUS OF KIDNEY: ICD-10-CM

## 2019-03-11 DIAGNOSIS — R33.9 RETENTION OF URINE, UNSPECIFIED: ICD-10-CM

## 2019-03-11 DIAGNOSIS — N32.81 OVERACTIVE BLADDER: ICD-10-CM

## 2019-03-11 DIAGNOSIS — N13.6 PYONEPHROSIS: ICD-10-CM

## 2019-03-11 PROCEDURE — 51798 US URINE CAPACITY MEASURE: CPT

## 2019-03-11 PROCEDURE — 51741 ELECTRO-UROFLOWMETRY FIRST: CPT

## 2019-03-11 PROCEDURE — 99213 OFFICE O/P EST LOW 20 MIN: CPT | Mod: 25

## 2019-03-11 RX ORDER — NITROFURANTOIN (MONOHYDRATE/MACROCRYSTALS) 25; 75 MG/1; MG/1
100 CAPSULE ORAL
Qty: 14 | Refills: 0 | Status: COMPLETED | COMMUNITY
Start: 2019-01-08 | End: 2019-03-11

## 2019-03-11 RX ORDER — TRAMADOL HYDROCHLORIDE 50 MG/1
50 TABLET, COATED ORAL
Qty: 12 | Refills: 0 | Status: COMPLETED | COMMUNITY
Start: 2018-12-31 | End: 2019-03-11

## 2019-03-11 NOTE — HISTORY OF PRESENT ILLNESS
[Urinary Urgency] : urinary urgency [Urinary Frequency] : urinary frequency [None] : None [FreeTextEntry1] : Dieter is a 53-year-old male, with a history of renal stones, status post left ureteroscopy with laser lithotripsy and stone basketing of a left ureteral stones in January 2019. Post procedure, he reports passing multiple small fragments without any complications. He saw them in the toilet he just let them go\par \par Additionally, he has been experiencing bothersome lower urinary tract symptoms manifesting as urgency, frequency, and feelings of incomplete bladder emptying.\par \par He presents today to review his 24 hour urine testing and uroflow study.\par

## 2019-03-11 NOTE — LETTER BODY
[Dear  ___] : Dear  [unfilled], [Courtesy Letter:] : I had the pleasure of seeing your patient, [unfilled], in my office today. [Please see my note below.] : Please see my note below. [Sincerely,] : Sincerely, [FreeTextEntry2] : Preston Jorge MD\par [address] [facility]\[place] [address]

## 2019-03-11 NOTE — ASSESSMENT
[FreeTextEntry1] : We reviewed his prior imaging, metabolic workup, and uroflow study.\par \par His voiding diary reveals good force of stream with elevated post void residual. After thorough review of all the options available and he is electing to begin tamsulosin p.o. q. daily. All usage, dosage, mechanism of action, and adverse events were reviewed. He will keep a voiding diary prior to starting tamsulosin and after 6 weeks on this medication.\par He will follow up after for a possible repeat uroflow study.\par \par Concerning his metabolic work up, he will decrease his sodium and animal protein intake and follow up with nephrology for further evaluation. He will repeat a metabolic workup in 6 weeks after he has maximized this diet change.\par \par A PSA has been requested prior to his next appointment.\par

## 2019-03-11 NOTE — LETTER HEADER
[FreeTextEntry3] : Antwon Rivers M.D.\par Director of Urology\par [facility]\par [address] [place] [address], [address]\[place] [address]

## 2019-05-17 ENCOUNTER — APPOINTMENT (OUTPATIENT)
Dept: UROLOGY | Facility: CLINIC | Age: 54
End: 2019-05-17
Payer: COMMERCIAL

## 2019-05-17 VITALS — BODY MASS INDEX: 34.13 KG/M2 | HEIGHT: 78 IN | WEIGHT: 295 LBS

## 2019-05-17 DIAGNOSIS — C64.9 MALIGNANT NEOPLASM OF UNSPECIFIED KIDNEY, EXCEPT RENAL PELVIS: ICD-10-CM

## 2019-05-17 LAB
BILIRUB UR QL STRIP: NORMAL
CLARITY UR: CLEAR
COLLECTION METHOD: NORMAL
GLUCOSE UR-MCNC: NORMAL
HCG UR QL: NORMAL EU/DL
HGB UR QL STRIP.AUTO: NORMAL
KETONES UR-MCNC: NORMAL
LEUKOCYTE ESTERASE UR QL STRIP: 75
NITRITE UR QL STRIP: NORMAL
PH UR STRIP: 6
PROT UR STRIP-MCNC: NORMAL
SP GR UR STRIP: 1.01

## 2019-05-17 PROCEDURE — 81003 URINALYSIS AUTO W/O SCOPE: CPT | Mod: QW

## 2019-05-17 PROCEDURE — 51741 ELECTRO-UROFLOWMETRY FIRST: CPT

## 2019-05-17 PROCEDURE — 51798 US URINE CAPACITY MEASURE: CPT

## 2019-05-17 PROCEDURE — 99215 OFFICE O/P EST HI 40 MIN: CPT | Mod: 25

## 2019-05-17 NOTE — ASSESSMENT
[FreeTextEntry1] : As a totality he’s feeling better, his numbers look better with respect to voiding but worse with respect to stones. Going to order a renal ultrasound. Number one I want to make sure there is no silent sequela to his left ureteroscopy and see if there’s any fragments that every form. Additionally though the CAT scan looked at his right kidney we would like to periodically check the right kidney because of his history of malignancy and I discussed with him having Dr. Dominguez or kidney cancer specialist follow him separately for that so we get subspecialty expertise for both of his issues. Will come back in after the ultrasound. As well I’m starting him back on potassium citrate i.e. Urocit-K and will take 60 mEq per day titrating his dose to a pH of between six and seven and I sent them a link on [employer] to the paper that will enable him to do that. If he finds is going above seven he will cut back below six he will raise it and he may find he needs to pills in the morning one in the afternoon one at night etc., etc. Will review those papers when he comes back in after the ultrasound and if he’s had a good level I will then see him perhaps every 3 to 4 months.

## 2019-05-17 NOTE — HISTORY OF PRESENT ILLNESS
[Urinary Urgency] : urinary urgency [Urinary Frequency] : urinary frequency [Nocturia] : nocturia [Straining] : straining [FreeTextEntry1] : Dieter had seen Dr. Baca in 2017, when he had a right partial nephrectomy I picked him up at the end of 2018 for left renal and ureteral stones and at that time the studies which were done cause of his kidney stones show no evidence of recurrence of his cancer. He was treated ureteroscopically to get both ureteral stones as well as a lower pole stone on the left. We noticed that when he was on the Flomax for the stones he was voiding better than it turned out he was assignment bladder outlet obstruction. We’ve had a note on the Flomax I asked to keep a record of his intake and output before and then on the Flomax. An initial uroflow was not that bad that he wasn’t emptying and depending on the findings we do a repeat uroflow. As well is now several months after his ureteroscopy he had gotten a metabolic workup which was suboptimal and changes the recommended he got a repeat to see if the changes had in fact been carried out and if so with a beneficial. From his viewpoint he says his flow is a lot better, he’s able to go through most of the night and though sometimes he has initial hesitation especially after a week and whether having relations more than they are during the week for the most part he is much happier on the medication.

## 2019-05-17 NOTE — LETTER BODY
[Courtesy Letter:] : I had the pleasure of seeing your patient, [unfilled], in my office today. [Dear  ___] : Dear  [unfilled], [Please see my note below.] : Please see my note below. [FreeTextEntry2] : Preston Jorge MD\par [address] [facility]\[place] [address] \par  [Sincerely,] : Sincerely,

## 2019-05-17 NOTE — PHYSICAL EXAM
[General Appearance - Well Developed] : well developed [General Appearance - Well Nourished] : well nourished [Well Groomed] : well groomed [Normal Appearance] : normal appearance [General Appearance - In No Acute Distress] : no acute distress [Abdomen Tenderness] : non-tender [Abdomen Soft] : soft [Respiration, Rhythm And Depth] : normal respiratory rhythm and effort [] : no respiratory distress [Costovertebral Angle Tenderness] : no ~M costovertebral angle tenderness [Oriented To Time, Place, And Person] : oriented to person, place, and time [Exaggerated Use Of Accessory Muscles For Inspiration] : no accessory muscle use [Affect] : the affect was normal [Not Anxious] : not anxious [Mood] : the mood was normal [Normal Station and Gait] : the gait and station were normal for the patient's age

## 2019-07-08 ENCOUNTER — APPOINTMENT (OUTPATIENT)
Dept: UROLOGY | Facility: CLINIC | Age: 54
End: 2019-07-08

## 2019-07-08 DIAGNOSIS — N20.0 CALCULUS OF KIDNEY: ICD-10-CM

## 2019-11-08 ENCOUNTER — OUTPATIENT (OUTPATIENT)
Dept: OUTPATIENT SERVICES | Facility: HOSPITAL | Age: 54
LOS: 1 days | Discharge: HOME | End: 2019-11-08

## 2019-11-08 ENCOUNTER — APPOINTMENT (OUTPATIENT)
Dept: UROLOGY | Facility: CLINIC | Age: 54
End: 2019-11-08
Payer: COMMERCIAL

## 2019-11-08 VITALS
SYSTOLIC BLOOD PRESSURE: 148 MMHG | HEIGHT: 78 IN | HEART RATE: 75 BPM | WEIGHT: 295 LBS | DIASTOLIC BLOOD PRESSURE: 83 MMHG | BODY MASS INDEX: 34.13 KG/M2

## 2019-11-08 DIAGNOSIS — Z90.5 ACQUIRED ABSENCE OF KIDNEY: Chronic | ICD-10-CM

## 2019-11-08 DIAGNOSIS — N20.0 CALCULUS OF KIDNEY: Chronic | ICD-10-CM

## 2019-11-08 DIAGNOSIS — Z98.890 OTHER SPECIFIED POSTPROCEDURAL STATES: Chronic | ICD-10-CM

## 2019-11-08 DIAGNOSIS — Z90.49 ACQUIRED ABSENCE OF OTHER SPECIFIED PARTS OF DIGESTIVE TRACT: Chronic | ICD-10-CM

## 2019-11-08 DIAGNOSIS — Z85.528 PERSONAL HISTORY OF OTHER MALIGNANT NEOPLASM OF KIDNEY: Chronic | ICD-10-CM

## 2019-11-08 PROCEDURE — 99214 OFFICE O/P EST MOD 30 MIN: CPT

## 2019-11-08 NOTE — HISTORY OF PRESENT ILLNESS
[None] : no symptoms [FreeTextEntry1] : Dieter is a 53-year-old male, with a history of RCC status post right partial nephrectomy by Dr. Baca in 2017, who we have been following for renal stones. He is status post left ureteroscopy with laser lithotripsy for left ureteral, as well as a Left lower pole stone, whose composition was 100% uric acid. He is maintained on potassium citrate 10 mEq 2 tabs 3 times daily and has been measuring his pH. He states his pH is in the 5 range in the early morning however, it is normalized throughout the day. He has seen nephrology who recommended a diet low in salt in addition to the K-Citrate.\par \par Additionally he has history of bothersome lower urinary tract symptoms well managed on tamsulosin p.o. q. daily. He denies any significantly bothersome irritative/obstructive voiding symptoms at this time.\par \par He presents today several months late due to a breast mass work up that thankfully was benign to review his renal sonogram.\par

## 2019-11-08 NOTE — PHYSICAL EXAM
[General Appearance - Well Developed] : well developed [Well Groomed] : well groomed [Normal Appearance] : normal appearance [General Appearance - Well Nourished] : well nourished [General Appearance - In No Acute Distress] : no acute distress [Respiration, Rhythm And Depth] : normal respiratory rhythm and effort [Edema] : no peripheral edema [] : no respiratory distress [Oriented To Time, Place, And Person] : oriented to person, place, and time [Exaggerated Use Of Accessory Muscles For Inspiration] : no accessory muscle use [Mood] : the mood was normal [Affect] : the affect was normal [Normal Station and Gait] : the gait and station were normal for the patient's age [No Focal Deficits] : no focal deficits [Not Anxious] : not anxious [Abdomen Soft] : soft [Abdomen Tenderness] : non-tender [Costovertebral Angle Tenderness] : no ~M costovertebral angle tenderness

## 2019-11-08 NOTE — LETTER HEADER
[FreeTextEntry3] : Antwon Rivers M.D.\par Director of Urology\par [facility]\par [address] [place] [address], [address]\[place] [address]

## 2019-11-08 NOTE — ASSESSMENT
[FreeTextEntry1] : It seems as if he has a new 4 mm stone. He is taking a potassium citrate and titrating based on pH however, he finds that overnight his pH in the morning is in the 5 range. He's been placed on a diet by nephrology and has been following it. I have suggested that if he tolerates it well to increase the evening dose to three tablets and see how that does. Please note he was reminded that we need to periodically check the potassium and if he doesn’t show up for follow-up we can’t do that..\par \par He will continue to titrate and followup in 3 months with a KUB/renal sono before.\par \par  A BMP was drawn today to check his potassium level on potassium citrate.

## 2019-11-08 NOTE — LETTER BODY
[Dear  ___] : Dear  [unfilled], [Sincerely,] : Sincerely, [Please see my note below.] : Please see my note below. [Courtesy Letter:] : I had the pleasure of seeing your patient, [unfilled], in my office today. [FreeTextEntry2] : Preston Jorge MD\par [address] [facility]\[place] [address] \par  [DrChristopher  ___] : Dr. CHU

## 2019-11-09 DIAGNOSIS — N20.0 CALCULUS OF KIDNEY: ICD-10-CM

## 2019-11-11 LAB
ANION GAP SERPL CALC-SCNC: 18 MMOL/L
BUN SERPL-MCNC: 22 MG/DL
CALCIUM SERPL-MCNC: 9.3 MG/DL
CHLORIDE SERPL-SCNC: 98 MMOL/L
CO2 SERPL-SCNC: 22 MMOL/L
CREAT SERPL-MCNC: 1.1 MG/DL
GLUCOSE SERPL-MCNC: 121 MG/DL
POTASSIUM SERPL-SCNC: 4.6 MMOL/L
SODIUM SERPL-SCNC: 138 MMOL/L

## 2020-02-10 ENCOUNTER — APPOINTMENT (OUTPATIENT)
Dept: UROLOGY | Facility: CLINIC | Age: 55
End: 2020-02-10

## 2020-05-01 ENCOUNTER — APPOINTMENT (OUTPATIENT)
Dept: UROLOGY | Facility: CLINIC | Age: 55
End: 2020-05-01

## 2021-04-16 ENCOUNTER — APPOINTMENT (OUTPATIENT)
Dept: UROLOGY | Facility: CLINIC | Age: 56
End: 2021-04-16
Payer: COMMERCIAL

## 2021-04-16 VITALS
DIASTOLIC BLOOD PRESSURE: 94 MMHG | BODY MASS INDEX: 36.45 KG/M2 | HEIGHT: 78 IN | TEMPERATURE: 97.8 F | SYSTOLIC BLOOD PRESSURE: 150 MMHG | WEIGHT: 315 LBS | HEART RATE: 73 BPM

## 2021-04-16 PROBLEM — N20.0 KIDNEY STONE ON RIGHT SIDE: Status: RESOLVED | Noted: 2017-10-09 | Resolved: 2021-04-16

## 2021-04-16 PROCEDURE — 99214 OFFICE O/P EST MOD 30 MIN: CPT

## 2021-04-16 PROCEDURE — 99072 ADDL SUPL MATRL&STAF TM PHE: CPT

## 2021-04-16 RX ORDER — TAMSULOSIN HYDROCHLORIDE 0.4 MG/1
0.4 CAPSULE ORAL
Qty: 90 | Refills: 3 | Status: COMPLETED | COMMUNITY
Start: 2019-03-11 | End: 2021-04-16

## 2021-04-16 NOTE — PHYSICAL EXAM
[General Appearance - Well Developed] : well developed [General Appearance - Well Nourished] : well nourished [Normal Appearance] : normal appearance [Well Groomed] : well groomed [General Appearance - In No Acute Distress] : no acute distress [FreeTextEntry1] : Mild obesity with no real change perhaps a little bit heavier than when I first met him [Abdomen Soft] : soft [Abdomen Tenderness] : non-tender [Abdomen Hernia] : no hernia was discovered [Costovertebral Angle Tenderness] : no ~M costovertebral angle tenderness [Urethral Meatus] : meatus normal [Penis Abnormality] : normal circumcised penis [Epididymis] : the epididymides were normal [Testes Tenderness] : no tenderness of the testes [Testes Mass (___cm)] : there were no testicular masses [Edema] : no peripheral edema [] : no respiratory distress [Respiration, Rhythm And Depth] : normal respiratory rhythm and effort [Exaggerated Use Of Accessory Muscles For Inspiration] : no accessory muscle use [Oriented To Time, Place, And Person] : oriented to person, place, and time [Affect] : the affect was normal [Mood] : the mood was normal [Not Anxious] : not anxious [Normal Station and Gait] : the gait and station were normal for the patient's age

## 2021-04-16 NOTE — PHYSICAL EXAM
[General Appearance - Well Developed] : well developed [General Appearance - Well Nourished] : well nourished [Normal Appearance] : normal appearance [Well Groomed] : well groomed [General Appearance - In No Acute Distress] : no acute distress [Abdomen Soft] : soft [Abdomen Tenderness] : non-tender [Abdomen Hernia] : no hernia was discovered [Costovertebral Angle Tenderness] : no ~M costovertebral angle tenderness [Urethral Meatus] : meatus normal [Penis Abnormality] : normal circumcised penis [Testes Tenderness] : no tenderness of the testes [Edema] : no peripheral edema [] : no respiratory distress [Respiration, Rhythm And Depth] : normal respiratory rhythm and effort [Exaggerated Use Of Accessory Muscles For Inspiration] : no accessory muscle use [Oriented To Time, Place, And Person] : oriented to person, place, and time [Affect] : the affect was normal [Mood] : the mood was normal [Not Anxious] : not anxious [Normal Station and Gait] : the gait and station were normal for the patient's age

## 2021-04-16 NOTE — LETTER BODY
[Dear  ___] : Dear  [unfilled], [Courtesy Letter:] : I had the pleasure of seeing your patient, [unfilled], in my office today. [Please see my note below.] : Please see my note below. [Sincerely,] : Sincerely, [FreeTextEntry2] : Preston Jorge MD\par [address] [facility]\[place] [address] \par

## 2021-04-16 NOTE — HISTORY OF PRESENT ILLNESS
[FreeTextEntry1] : Dieter is a 55-year-old male with a long urologic history.\par \par In 2014 Herbie cameron did a partial right nephrectomy and Dieter sort of disappeared from view. He came back in December 2018 with an obstructing left-sided stone treated with a stent and then subsequently underwent left ureteroscopy with laser lithotripsy of both ureteral and renal stone. He did clear all the stones from the left lower pole a either accumulated or when he had a piece left but he ended up with a 5 mm fragment. We were following it we had him on potassium citrate was last seen in November 2019 was post to come back in February for which he was a no-show. By the time he got back to rescheduling Covid had hit and though we have not back to seeing patient since July of this was his first opportunity with everything else going on to take care of himself. He ran out of potassium citrate a long time ago and stopped taking it though the metabolic work-up had indicated low citrate as part of his causative factors for stone formation.\par \par What happened recently is he developed right groin pain, please see below it ranges from a twinge to an ache it was happening a few times a week is now happening perhaps twice it was lasting longer now can last a few minutes as little as a twinge to perhaps 5 minutes and is now dropped to twice a week. He has had a right inguinal hernia he has had to cancel surgery he has had the stones of his doctor sent him for CT with contrast I am not sure why not pre and post but this is what we have. It was done on March 26, 2021 it showed a stable left lower pole stone of 5 mm and is here to see if we have anything else to add.\par \par He tells me he keeps himself hydrated but is somewhat limited by his voiding dysfunction. He takes Flomax not great he has some urgency some frequency of the stream is not as strong as he like he does not empty completely but he only wakes up once and is close to morning and it is not bothering him enough that he would accept any treatment options other than medication he is already on Flomax\par \par New problem list developed probably over the last 6 months as his erections are more difficult to obtain, when he gets them they are not as rigid as they used to be and that we can have sex is just not as much fun. He is always concerned that etc. disappear before he is finished and even when he does reach orgasm if not as intense. His libido was not as high as it used to be possible because he is not functioning as well as he used to or possibly a priority from its own problem. The question then is which came first the chicken or the egg. Finally once he does reach orgasm occasionally the ejaculate is not what it used to be. There may be Flomax related it may be due to other issues especially as he has been on the Flomax for a while and did not have this problem in the past. [Aching] : aching [Gradual] : gradual [___ Month(s) Ago] : [unfilled] month(s) ago [Intermittent] : intermittently [___ x Week] : occur [unfilled] times a week [Mild] : mild [Improving] : improving [None] : No relieving factors are noted [de-identified] : Right groin pain without radiation ranges from a twinge up to 5 on a scale of 1-10

## 2021-04-16 NOTE — LETTER HEADER
[FreeTextEntry3] : Antwon Rivers M.D.\par Director of Urology\par [facility]\par [address] [place] [address], [address]\[place] [address]

## 2021-04-16 NOTE — ASSESSMENT
[FreeTextEntry1] : I do not know what his right groin pain is due to. The left lower pole stone is stable and it has been for close to 2 years now the question is sure we try and shockwave it stone passes or leave well enough alone. My gut feeling is that has not changed in 2 years is not bothering him just wait and wants especially since he is in the process of getting evaluation for numerous other medical issues. Perhaps when those issues overall optimized we can reconsider making him stone free\par \par As far as his potassium citrate I have not had a metabolic work-up he has not seen a nephrologist in a while I think he should see the nephrologist let them manage his medication and see if he needs something to help him prevent further stone formation and current stone propagation.\par \par Finally his urination is not great but he is comfortable enough he will stay with the Flomax and we have not had a bladder ultrasound in a while but I will wait until where considering treating the left kidney stone and get a renal bladder ultrasound at the same time\par \par To put it another way we will going to let sleeping dogs lie when he is finished with his other current issues he will come back in for 6 months which ever comes first at that point we will get a renal and bladder ultrasound see if there is anything else we have to do for his urination see if you want to consider shockwave lithotripsy in the interim he will ask his doctor for referral to a nephrologist and see what they can do to help minimize future stone growth\par \par Please note with respect to his erectile dysfunction it is not enough that it present given the other issues that he is looking into that he wants to pursue that further if it begins to bother him more we will reevaluate then.

## 2021-04-16 NOTE — HISTORY OF PRESENT ILLNESS
[FreeTextEntry1] : Dieter has a long urologic history.  10/20/2014 Dr. Stoner wants to do partial nephrectomy saw him in follow-up in 2017.\par \par I met him in December 2018 when he had an obstructing left ureteral stone with associated left lower pole stones.  He underwent placement of a double-J and then at the end of 2018 went for ureteroscopy were to cure the ureteral stone and broke up the left lower pole stones.  Follow-up study showed that there just the lower pole had reaccumulated and now totaled either 1 or 2 small stones up to 5 mm.  He was followed with Dr. Johnson from nephrology supposed to be on potassium citrate he was last seen in November 2019 was supposed to come back in February 2020 after an ultrasound.  The ultrasound was done that appointment was missed and then Covid hit.  By the time he got recovered from his bout with Covid he had numerous other issues really did not focus on his urologic care.  He does have voiding dysfunction which he treats with Flomax it is not as good as he would like but he is not willing to consider more invasive procedures.  He also finds that his erections are just not what they were.  He gets them he can use them but sometimes they do not last as well as he like an orgasm is not as intense as it used to be.  He acknowledges that some of this could be fairly failure making him hurry up.\par \par He developed right-sided groin pain they were not sure what it was due to so he was sent for a CAT scan for some reason only postcontrast.  It showed the 5 mm left lower pole stone it did not show any evidence of a renal mass.  The prostate was noted is 5.7 cm in transverse diameter but the other 2 diameters were not given and I do not have the films to review myself. [Urinary Urgency] : urinary urgency [Urinary Frequency] : urinary frequency [Nocturia] : nocturia [Weak Stream] : weak stream [Erectile Dysfunction] : Erectile Dysfunction [Aching] : aching [Gradual] : gradual [___ Month(s) Ago] : [unfilled] month(s) ago [Intermittent] : intermittently [___ x Week] : occur [unfilled] times a week [Mild] : mild [Improving] : improving [None] : No relieving factors are noted [de-identified] : Right groin pain without radiation ranges from a twinge up to 5 on a scale of 1-10

## 2021-04-16 NOTE — LETTER BODY
[Dear  ___] : Dear  [unfilled], [Courtesy Letter:] : I had the pleasure of seeing your patient, [unfilled], in my office today. [Please see my note below.] : Please see my note below. [Sincerely,] : Sincerely, [FreeTextEntry2] : Preston Jorge MD\par [address] [facility]\[place] [address] \par  [DrChristopher  ___] : Dr. CHU

## 2021-04-16 NOTE — ASSESSMENT
[FreeTextEntry1] : There are several issues here.  With respect to the left lower pole stone it is not bothering him he has other issues that he feels are taking priority we will get an ultrasound in several months with a KUB and consider shockwave lithotripsy.  I have asked him to contact the nephrologist and see if he can restart him on potassium citrate if he feels the need and consider any other options for stone optimization.  However I must say that since 2019 which is over 2 years he has made no new stones and the current stone has not grown\par \par With respect to his voiding dysfunction will be get the ultrasound of the kidney look at one of the bladder get the dimensions check his postvoid look at bladder wall thickness and see how he is doing if it is bothering him enough we might consider further evaluation.\par \par With respect to his erections at this point he does not want to take sildenafil or Cialis.  He will wait and see if once his other issues are taken care of and things calm down if his erections return to normal.\par \par Finally with respect to his right groin pain I do not feel a recurrent hernia which not due to stones we will speak to his PCP and see a can figure it out

## 2021-04-16 NOTE — LETTER HEADER
[FreeTextEntry3] : Antwon Rivers M.D.\par Director of Urology\par [facility]\par [address] [place] [address], [address]\[place] [address]
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
## 2022-07-29 NOTE — ED ADULT NURSE NOTE - NSSISCREENINGQ1_ED_A_ED
No Dutasteride Male Counseling: Dustasteride Counseling:  I discussed with the patient the risks of use of dutasteride including but not limited to decreased libido, decreased ejaculate volume, and gynecomastia. Women who can become pregnant should not handle medication.  All of the patient's questions and concerns were addressed. Dutasteride Counseling: Dustasteride Counseling:  I discussed with the patient the risks of use of dutasteride including but not limited to decreased libido, decreased ejaculate volume, and gynecomastia. Women who can become pregnant should not handle medication.  All of the patient's questions and concerns were addressed.

## 2022-08-30 ENCOUNTER — INPATIENT (INPATIENT)
Facility: HOSPITAL | Age: 57
LOS: 1 days | Discharge: HOME | End: 2022-09-01
Attending: SURGERY | Admitting: SURGERY

## 2022-08-30 VITALS
SYSTOLIC BLOOD PRESSURE: 188 MMHG | OXYGEN SATURATION: 97 % | WEIGHT: 304.9 LBS | RESPIRATION RATE: 20 BRPM | HEART RATE: 66 BPM | HEIGHT: 78 IN | DIASTOLIC BLOOD PRESSURE: 96 MMHG | TEMPERATURE: 99 F

## 2022-08-30 DIAGNOSIS — Z85.528 PERSONAL HISTORY OF OTHER MALIGNANT NEOPLASM OF KIDNEY: Chronic | ICD-10-CM

## 2022-08-30 DIAGNOSIS — N20.0 CALCULUS OF KIDNEY: Chronic | ICD-10-CM

## 2022-08-30 DIAGNOSIS — Z90.49 ACQUIRED ABSENCE OF OTHER SPECIFIED PARTS OF DIGESTIVE TRACT: Chronic | ICD-10-CM

## 2022-08-30 DIAGNOSIS — Z90.5 ACQUIRED ABSENCE OF KIDNEY: Chronic | ICD-10-CM

## 2022-08-30 DIAGNOSIS — Z98.890 OTHER SPECIFIED POSTPROCEDURAL STATES: Chronic | ICD-10-CM

## 2022-08-30 LAB
ALBUMIN SERPL ELPH-MCNC: 4.5 G/DL — SIGNIFICANT CHANGE UP (ref 3.5–5.2)
ALP SERPL-CCNC: 80 U/L — SIGNIFICANT CHANGE UP (ref 30–115)
ALT FLD-CCNC: 19 U/L — SIGNIFICANT CHANGE UP (ref 0–41)
ANION GAP SERPL CALC-SCNC: 9 MMOL/L — SIGNIFICANT CHANGE UP (ref 7–14)
APPEARANCE UR: CLEAR — SIGNIFICANT CHANGE UP
AST SERPL-CCNC: 16 U/L — SIGNIFICANT CHANGE UP (ref 0–41)
BACTERIA # UR AUTO: SIGNIFICANT CHANGE UP /HPF
BASOPHILS # BLD AUTO: 0.06 K/UL — SIGNIFICANT CHANGE UP (ref 0–0.2)
BASOPHILS NFR BLD AUTO: 0.8 % — SIGNIFICANT CHANGE UP (ref 0–1)
BILIRUB SERPL-MCNC: 0.4 MG/DL — SIGNIFICANT CHANGE UP (ref 0.2–1.2)
BILIRUB UR-MCNC: NEGATIVE — SIGNIFICANT CHANGE UP
BUN SERPL-MCNC: 21 MG/DL — HIGH (ref 10–20)
CALCIUM SERPL-MCNC: 9.4 MG/DL — SIGNIFICANT CHANGE UP (ref 8.5–10.1)
CHLORIDE SERPL-SCNC: 102 MMOL/L — SIGNIFICANT CHANGE UP (ref 98–110)
CO2 SERPL-SCNC: 28 MMOL/L — SIGNIFICANT CHANGE UP (ref 17–32)
COLOR SPEC: YELLOW — SIGNIFICANT CHANGE UP
CREAT SERPL-MCNC: 1.3 MG/DL — SIGNIFICANT CHANGE UP (ref 0.7–1.5)
DIFF PNL FLD: ABNORMAL
EGFR: 65 ML/MIN/1.73M2 — SIGNIFICANT CHANGE UP
EOSINOPHIL # BLD AUTO: 0.12 K/UL — SIGNIFICANT CHANGE UP (ref 0–0.7)
EOSINOPHIL NFR BLD AUTO: 1.6 % — SIGNIFICANT CHANGE UP (ref 0–8)
EPI CELLS # UR: ABNORMAL /HPF
GLUCOSE SERPL-MCNC: 198 MG/DL — HIGH (ref 70–99)
GLUCOSE UR QL: 100 MG/DL
HCT VFR BLD CALC: 43.6 % — SIGNIFICANT CHANGE UP (ref 42–52)
HGB BLD-MCNC: 14.7 G/DL — SIGNIFICANT CHANGE UP (ref 14–18)
IMM GRANULOCYTES NFR BLD AUTO: 0.3 % — SIGNIFICANT CHANGE UP (ref 0.1–0.3)
KETONES UR-MCNC: NEGATIVE — SIGNIFICANT CHANGE UP
LEUKOCYTE ESTERASE UR-ACNC: NEGATIVE — SIGNIFICANT CHANGE UP
LYMPHOCYTES # BLD AUTO: 1.77 K/UL — SIGNIFICANT CHANGE UP (ref 1.2–3.4)
LYMPHOCYTES # BLD AUTO: 23.5 % — SIGNIFICANT CHANGE UP (ref 20.5–51.1)
MCHC RBC-ENTMCNC: 29.7 PG — SIGNIFICANT CHANGE UP (ref 27–31)
MCHC RBC-ENTMCNC: 33.7 G/DL — SIGNIFICANT CHANGE UP (ref 32–37)
MCV RBC AUTO: 88.1 FL — SIGNIFICANT CHANGE UP (ref 80–94)
MONOCYTES # BLD AUTO: 0.64 K/UL — HIGH (ref 0.1–0.6)
MONOCYTES NFR BLD AUTO: 8.5 % — SIGNIFICANT CHANGE UP (ref 1.7–9.3)
NEUTROPHILS # BLD AUTO: 4.91 K/UL — SIGNIFICANT CHANGE UP (ref 1.4–6.5)
NEUTROPHILS NFR BLD AUTO: 65.3 % — SIGNIFICANT CHANGE UP (ref 42.2–75.2)
NITRITE UR-MCNC: NEGATIVE — SIGNIFICANT CHANGE UP
NRBC # BLD: 0 /100 WBCS — SIGNIFICANT CHANGE UP (ref 0–0)
PH UR: 5.5 — SIGNIFICANT CHANGE UP (ref 5–8)
PLATELET # BLD AUTO: 212 K/UL — SIGNIFICANT CHANGE UP (ref 130–400)
POTASSIUM SERPL-MCNC: 4.5 MMOL/L — SIGNIFICANT CHANGE UP (ref 3.5–5)
POTASSIUM SERPL-SCNC: 4.5 MMOL/L — SIGNIFICANT CHANGE UP (ref 3.5–5)
PROT SERPL-MCNC: 7.3 G/DL — SIGNIFICANT CHANGE UP (ref 6–8)
PROT UR-MCNC: ABNORMAL MG/DL
RBC # BLD: 4.95 M/UL — SIGNIFICANT CHANGE UP (ref 4.7–6.1)
RBC # FLD: 13 % — SIGNIFICANT CHANGE UP (ref 11.5–14.5)
RBC CASTS # UR COMP ASSIST: ABNORMAL /HPF
SARS-COV-2 RNA SPEC QL NAA+PROBE: SIGNIFICANT CHANGE UP
SODIUM SERPL-SCNC: 139 MMOL/L — SIGNIFICANT CHANGE UP (ref 135–146)
SP GR SPEC: >=1.03 (ref 1.01–1.03)
UROBILINOGEN FLD QL: 0.2 MG/DL — SIGNIFICANT CHANGE UP
WBC # BLD: 7.52 K/UL — SIGNIFICANT CHANGE UP (ref 4.8–10.8)
WBC # FLD AUTO: 7.52 K/UL — SIGNIFICANT CHANGE UP (ref 4.8–10.8)
WBC UR QL: SIGNIFICANT CHANGE UP /HPF

## 2022-08-30 PROCEDURE — 99285 EMERGENCY DEPT VISIT HI MDM: CPT | Mod: 25

## 2022-08-30 PROCEDURE — 74176 CT ABD & PELVIS W/O CONTRAST: CPT | Mod: 26,MA

## 2022-08-30 PROCEDURE — 36000 PLACE NEEDLE IN VEIN: CPT

## 2022-08-30 RX ORDER — LANOLIN ALCOHOL/MO/W.PET/CERES
3 CREAM (GRAM) TOPICAL AT BEDTIME
Refills: 0 | Status: DISCONTINUED | OUTPATIENT
Start: 2022-08-30 | End: 2022-08-31

## 2022-08-30 RX ORDER — MORPHINE SULFATE 50 MG/1
6 CAPSULE, EXTENDED RELEASE ORAL ONCE
Refills: 0 | Status: DISCONTINUED | OUTPATIENT
Start: 2022-08-30 | End: 2022-08-30

## 2022-08-30 RX ORDER — ONDANSETRON 8 MG/1
4 TABLET, FILM COATED ORAL ONCE
Refills: 0 | Status: DISCONTINUED | OUTPATIENT
Start: 2022-08-30 | End: 2022-08-30

## 2022-08-30 RX ORDER — SODIUM CHLORIDE 9 MG/ML
1000 INJECTION INTRAMUSCULAR; INTRAVENOUS; SUBCUTANEOUS ONCE
Refills: 0 | Status: COMPLETED | OUTPATIENT
Start: 2022-08-30 | End: 2022-08-30

## 2022-08-30 RX ORDER — SODIUM CHLORIDE 9 MG/ML
1000 INJECTION INTRAMUSCULAR; INTRAVENOUS; SUBCUTANEOUS
Refills: 0 | Status: DISCONTINUED | OUTPATIENT
Start: 2022-08-30 | End: 2022-08-31

## 2022-08-30 RX ORDER — ESCITALOPRAM OXALATE 10 MG/1
10 TABLET, FILM COATED ORAL DAILY
Refills: 0 | Status: DISCONTINUED | OUTPATIENT
Start: 2022-08-30 | End: 2022-08-31

## 2022-08-30 RX ORDER — MORPHINE SULFATE 50 MG/1
4 CAPSULE, EXTENDED RELEASE ORAL EVERY 4 HOURS
Refills: 0 | Status: DISCONTINUED | OUTPATIENT
Start: 2022-08-30 | End: 2022-08-31

## 2022-08-30 RX ORDER — ONDANSETRON 8 MG/1
4 TABLET, FILM COATED ORAL EVERY 8 HOURS
Refills: 0 | Status: DISCONTINUED | OUTPATIENT
Start: 2022-08-30 | End: 2022-08-31

## 2022-08-30 RX ORDER — KETOROLAC TROMETHAMINE 30 MG/ML
15 SYRINGE (ML) INJECTION ONCE
Refills: 0 | Status: DISCONTINUED | OUTPATIENT
Start: 2022-08-30 | End: 2022-08-30

## 2022-08-30 RX ORDER — TAMSULOSIN HYDROCHLORIDE 0.4 MG/1
0.4 CAPSULE ORAL AT BEDTIME
Refills: 0 | Status: DISCONTINUED | OUTPATIENT
Start: 2022-08-30 | End: 2022-08-31

## 2022-08-30 RX ORDER — MORPHINE SULFATE 50 MG/1
4 CAPSULE, EXTENDED RELEASE ORAL ONCE
Refills: 0 | Status: DISCONTINUED | OUTPATIENT
Start: 2022-08-30 | End: 2022-08-30

## 2022-08-30 RX ORDER — ACETAMINOPHEN 500 MG
650 TABLET ORAL EVERY 6 HOURS
Refills: 0 | Status: DISCONTINUED | OUTPATIENT
Start: 2022-08-30 | End: 2022-08-31

## 2022-08-30 RX ORDER — KETOROLAC TROMETHAMINE 30 MG/ML
15 SYRINGE (ML) INJECTION EVERY 6 HOURS
Refills: 0 | Status: DISCONTINUED | OUTPATIENT
Start: 2022-08-30 | End: 2022-08-31

## 2022-08-30 RX ADMIN — MORPHINE SULFATE 4 MILLIGRAM(S): 50 CAPSULE, EXTENDED RELEASE ORAL at 16:23

## 2022-08-30 RX ADMIN — MORPHINE SULFATE 4 MILLIGRAM(S): 50 CAPSULE, EXTENDED RELEASE ORAL at 22:43

## 2022-08-30 RX ADMIN — TAMSULOSIN HYDROCHLORIDE 0.4 MILLIGRAM(S): 0.4 CAPSULE ORAL at 21:33

## 2022-08-30 RX ADMIN — Medication 15 MILLIGRAM(S): at 23:43

## 2022-08-30 RX ADMIN — SODIUM CHLORIDE 125 MILLILITER(S): 9 INJECTION INTRAMUSCULAR; INTRAVENOUS; SUBCUTANEOUS at 19:59

## 2022-08-30 RX ADMIN — SODIUM CHLORIDE 1000 MILLILITER(S): 9 INJECTION INTRAMUSCULAR; INTRAVENOUS; SUBCUTANEOUS at 13:26

## 2022-08-30 RX ADMIN — MORPHINE SULFATE 4 MILLIGRAM(S): 50 CAPSULE, EXTENDED RELEASE ORAL at 19:57

## 2022-08-30 RX ADMIN — Medication 3 MILLIGRAM(S): at 21:28

## 2022-08-30 RX ADMIN — Medication 15 MILLIGRAM(S): at 21:29

## 2022-08-30 RX ADMIN — Medication 15 MILLIGRAM(S): at 13:26

## 2022-08-30 NOTE — H&P ADULT - NSHPPHYSICALEXAM_GEN_ALL_CORE
Vital Signs (24 Hrs):  T(C): 37.1 (08-30-22 @ 12:13), Max: 37.1 (08-30-22 @ 12:13)  HR: 66 (08-30-22 @ 12:13) (66 - 66)  BP: 188/96 (08-30-22 @ 12:13) (188/96 - 188/96)  RR: 20 (08-30-22 @ 12:13) (20 - 20)  SpO2: 97% (08-30-22 @ 12:13) (97% - 97%)    PHYSICAL EXAM:  GENERAL: NAD, well-developed  SKIN: No rashes or lesions  HEAD:  Atraumatic, Normocephalic  EYES: EOMI, PERRLA, conjunctiva and sclera clear  NECK: Supple, No JVD  CHEST/LUNG: Clear to auscultation bilaterally; No wheeze  HEART: Regular rate and rhythm; No murmurs, rubs, or gallops  ABDOMEN: Soft, Nontender, Nondistended; Bowel sounds present  EXTREMITIES:  No clubbing, cyanosis, or edema  CNS: AAOx3

## 2022-08-30 NOTE — H&P ADULT - HISTORY OF PRESENT ILLNESS
56M w/PMHx of RCC s/p partial Nephrectomy, Renal Stones, MDD presents to ED with complaints of left sided flank pain.  Patient reports symptoms began 5 days ago with painless hematuria that progressed to left flank the next day.  Patient reports the pain and hematuria subsided spontaneously the next day but left flank reoccured this AM and persisted.  patient describes the pain as sharp, constant and severe.  He reports he came to ED for evaluation.  No complaints of CP/SOB.  No fever chills.  No N/V.  No dysuria

## 2022-08-30 NOTE — ED ADULT NURSE NOTE - NSICDXFAMILYHX_GEN_ALL_CORE_FT
FAMILY HISTORY:  Mother  Still living? Yes, Estimated age: Age Unknown  Cancer, Age at diagnosis: Age Unknown

## 2022-08-30 NOTE — ED PROVIDER NOTE - CLINICAL SUMMARY MEDICAL DECISION MAKING FREE TEXT BOX
57 y/o M PMHx Renal Cell Carcinoma s/p partial Nephrectomy, Kidney Stones, MDD presents to ED with complaints of left sided flank pain and a symptomatic kidney stone.  Patient reports symptoms began 5 days ago with painless hematuria that progressed to left flank the next day.  Patient reports the pain and hematuria subsided spontaneously the next day but left flank reoccured this AM and persisted.  patient describes the pain as sharp, constant and severe.  He reports he came to ED for evaluation.  No complaints of CP/SOB.  No fever chills.  No N/V.  No dysuria. On exam, VS reviewed. no CVA tenderness. Patient in pain. IV placed and labs sent, CT scan done.    Work up consistent with a symptomatic kidney stone.  Pain persisted in the Emergency Department despite NSAID and parental narcotics.  Labs, imaging and urine reviewed. Urology consulted for care.    ED work up reviewed and results and plan of care discussed with patient. Patient requires admission for further work up, monitoring, and management. Need for admission discussed with patient.

## 2022-08-30 NOTE — ED PROVIDER NOTE - NS ED ROS FT
Constitutional: No fever   Eyes:  No visual changes  Ears:  No hearing changes  Neck: No neck pain  Cardiac:  No chest pain  Respiratory:  No SOB   GI:  No abdominal pain, nausea, or vomiting   :  No dysuria +hematuria +flank pain  MS:  No back pain  Neuro:  No headache or weakness.  No LOC  Skin:  No skin rash

## 2022-08-30 NOTE — PATIENT PROFILE ADULT - PATIENT'S PREFERRED PRONOUN
My Asthma Action Plan    Name: Venkata Lynn   YOB: 1965  Date: 9/22/2021   My doctor: Lyndon Stoll MD   My clinic: [facility]        My Control Medicine: Trelegy  My Rescue Medicine: Albuterol (Proair/Ventolin/Proventil HFA) 2-4 puffs EVERY 4 HOURS as needed. Use a spacer if recommended by your provider.   My Asthma Severity:   mild  Know your asthma triggers:              GREEN ZONE   Good Control    I feel good    No cough or wheeze    Can work, sleep and play without asthma symptoms       Take your asthma control medicine every day.     1. If exercise triggers your asthma, take your rescue medication    15 minutes before exercise or sports, and    During exercise if you have asthma symptoms  2. Spacer to use with inhaler: If you have a spacer, make sure to use it with your inhaler             YELLOW ZONE Getting Worse  I have ANY of these:    I do not feel good    Cough or wheeze    Chest feels tight    Wake up at night   1. Keep taking your Green Zone medications  2. Start taking your rescue medicine:    every 20 minutes for up to 1 hour. Then every 4 hours for 24-48 hours.  3. If you stay in the Yellow Zone for more than 12-24 hours, contact your doctor.  4. If you do not return to the Green Zone in 12-24 hours or you get worse, start taking your oral steroid medicine if prescribed by your provider.           RED ZONE Medical Alert - Get Help  I have ANY of these:    I feel awful    Medicine is not helping    Breathing getting harder    Trouble walking or talking    Nose opens wide to breathe       1. Take your rescue medicine NOW  2. If your provider has prescribed an oral steroid medicine, start taking it NOW  3. Call your doctor NOW  4. If you are still in the Red Zone after 20 minutes and you have not reached your doctor:    Take your rescue medicine again and    Call 911 or go to the emergency room right away    See your regular doctor within 2 weeks of an  Emergency Room or Urgent Care visit for follow-up treatment.          Annual Reminders:  Meet with Asthma Educator,  Flu Shot in the Fall, consider Pneumonia Vaccination for patients with asthma (aged 19 and older).    Pharmacy: [facility] [id number]    Electronically signed by Lyndon Stoll MD   Date: 09/22/21                      Asthma Triggers  How To Control Things That Make Your Asthma Worse    Triggers are things that make your asthma worse.  Look at the list below to help you find your triggers and what you can do about them.  You can help prevent asthma flare-ups by staying away from your triggers.      Trigger                                                          What you can do   Cigarette Smoke  Tobacco smoke can make asthma worse. Do not allow smoking in your home, car or around you.  Be sure no one smokes at a child s day care or school.  If you smoke, ask your health care provider for ways to help you quit.  Ask family members to quit too.  Ask your health care provider for a referral to Quit Plan to help you quit smoking, or call 2-240-547-PLAN.     Colds, Flu, Bronchitis  These are common triggers of asthma. Wash your hands often.  Don t touch your eyes, nose or mouth.  Get a flu shot every year.     Dust Mites  These are tiny bugs that live in cloth or carpet. They are too small to see. Wash sheets and blankets in hot water every week.   Encase pillows and mattress in dust mite proof covers.  Avoid having carpet if you can. If you have carpet, vacuum weekly.   Use a dust mask and HEPA vacuum.   Pollen and Outdoor Mold  Some people are allergic to trees, grass, or weed pollen, or molds. Try to keep your windows closed.  Limit time out doors when pollen count is high.   Ask you health care provider about taking medicine during allergy season.     Animal Dander  Some people are allergic to skin flakes, urine or saliva from pets with fur or feathers.  Keep pets with fur or feathers out of your home.    If you can t keep the pet outdoors, then keep the pet out of your bedroom.  Keep the bedroom door closed.  Keep pets off cloth furniture and away from stuffed toys.     Mice, Rats, and Cockroaches   Some people are allergic to the waste from these pests.   Cover food and garbage.  Clean up spills and food crumbs.  Store grease in the refrigerator.   Keep food out of the bedroom.   Indoor Mold  This can be a trigger if your home has high moisture. Fix leaking faucets, pipes, or other sources of water.   Clean moldy surfaces.  Dehumidify basement if it is damp and smelly.   Smoke, Strong Odors, and Sprays  These can reduce air quality. Stay away from strong odors and sprays, such as perfume, powder, hair spray, paints, smoke incense, paint, cleaning products, candles and new carpet.   Exercise or Sports  Some people with asthma have this trigger. Be active!  Ask your doctor about taking medicine before sports or exercise to prevent symptoms.    Warm up for 5-10 minutes before and after sports or exercise.     Other Triggers of Asthma  Cold air:  Cover your nose and mouth with a scarf.  Sometimes laughing or crying can be a trigger.  Some medicines and food can trigger asthma.        Him/He

## 2022-08-30 NOTE — ED PROVIDER NOTE - PHYSICAL EXAMINATION
CONSTITUTIONAL:  in acute distress  SKIN: warm, dry  HEAD: Normocephalic  EYES: no conjunctival erythema  ENT: no nasal discharge, airway clear  NECK: full ROM,  CARD: regular rate and rhythm  RESP: normal respiratory effort, no wheezes, rales or rhonchi  ABD: soft, non-distended, non-tender no cva tenderness  EXT: moving all extremities spontaneously  NEURO: alert and oriented, grossly unremarkable  PSYCH: cooperative, appropriate

## 2022-08-30 NOTE — ED ADULT NURSE NOTE - NSICDXPASTSURGICALHX_GEN_ALL_CORE_FT
PAST SURGICAL HISTORY:  H/O bilateral inguinal hernia repair     H/O partial nephrectomy right    History of appendectomy     History of kidney cancer     Kidney stones

## 2022-08-30 NOTE — H&P ADULT - NSHPLABSRESULTS_GEN_ALL_CORE
14.7   7.52  )-----------( 212      ( 30 Aug 2022 13:30 )             43.6       08-30    139  |  102  |  21<H>  ----------------------------<  198<H>  4.5   |  28  |  1.3    Ca    9.4      30 Aug 2022 13:30    TPro  7.3  /  Alb  4.5  /  TBili  0.4  /  DBili  x   /  AST  16  /  ALT  19  /  AlkPhos  80  08-30                  Urinalysis Basic - ( 30 Aug 2022 13:30 )    Color: Yellow / Appearance: Clear / SG: >=1.030 / pH: x  Gluc: x / Ketone: Negative  / Bili: Negative / Urobili: 0.2 mg/dL   Blood: x / Protein: Trace mg/dL / Nitrite: Negative   Leuk Esterase: Negative / RBC: 11-25 /HPF / WBC 3-5 /HPF   Sq Epi: x / Non Sq Epi: Occasional /HPF / Bacteria: Ocasional /HPF    CT A/P    IMPRESSION:    4 x 4 x 8 mm left mid ureteral calculus with mild to moderate left   hydroureteronephrosis. 14.7   7.52  )-----------( 212      ( 30 Aug 2022 13:30 )             43.6       08-30    139  |  102  |  21<H>  ----------------------------<  198<H>  4.5   |  28  |  1.3    Ca    9.4      30 Aug 2022 13:30    TPro  7.3  /  Alb  4.5  /  TBili  0.4  /  DBili  x   /  AST  16  /  ALT  19  /  AlkPhos  80  08-30                  Urinalysis Basic - ( 30 Aug 2022 13:30 )    Color: Yellow / Appearance: Clear / SG: >=1.030 / pH: x  Gluc: x / Ketone: Negative  / Bili: Negative / Urobili: 0.2 mg/dL   Blood: x / Protein: Trace mg/dL / Nitrite: Negative   Leuk Esterase: Negative / RBC: 11-25 /HPF / WBC 3-5 /HPF   Sq Epi: x / Non Sq Epi: Occasional /HPF / Bacteria: Ocasional /HPF    CT A/P    IMPRESSION:    4 x 4 x 8 mm left mid ureteral calculus with mild to moderate left   hydroureteronephrosis.    15 mm left lower pole stone

## 2022-08-30 NOTE — H&P ADULT - NSHPADDITIONALINFOADULT_GEN_ALL_CORE
met with the pt, reviewed the films we will try and grt to the stone but prn JJ and then return for 2nd stage

## 2022-08-30 NOTE — PATIENT PROFILE ADULT - FALL HARM RISK - HARM RISK INTERVENTIONS

## 2022-08-30 NOTE — H&P ADULT - ASSESSMENT
56M w/PMHx of RCC s/p partial Nephrectomy, Renal Stones, MDD presents to ED with complaints of left sided flank pain, admitted to surgery service for further management    #Obs left mid-uretral calculs  - admit to surgery service  - Regular diet for now, NPO after MN  - For OR in AM w/Dr. Rivers  - Toradol 15mg q6h PRN moderate pain, MSO4 4mg IVP q4h PRN severe pain  - Flomax 0.4mg PO QHS  - IVF, 0.9%NS at 125/hr  - strain all urine  - no need for ABX  - recheck AM labs  - medicine c/s for pre-op risk stratification  - d/w attending

## 2022-08-30 NOTE — ED ADULT TRIAGE NOTE - HEIGHT IN INCHES
OFFICE VISIT      Patient: Ev Villar Date of Service: 2019   : 1936 MRN: [id number]     SUBJECTIVE:     Chief Complaint   Patient presents with   • Hypertension   • Diabetes   • Hyperlipidemia   • Medication Refill     simvastatin      Last seen on 18  she is not accompanied.  Patient is agreeable to have a scribe present during the visit.     CONSULTS SINCE LAST VISIT:   DEXA (3/12/19), Acute Care (3/14/19)    Patient presents today for chronic disease management; she has no new complaints and has been compliant with all prescribed medications.      Review of Systems   Constitutional: Negative for fever.   Respiratory: Negative for shortness of breath.    Cardiovascular: Negative for chest pain.   All other systems reviewed and are negative.        ALLERGIES:  ALLERGIES:  No Known Allergies      MEDICATIONS:  Current Outpatient Medications   Medication Sig   • amLODIPine (NORVASC) 2.5 MG tablet 1/2 tab twice daily for blood pressure--   • simvastatin (ZOCOR) 40 MG tablet TAKE  1/2  TABLET BY MOUTH EVERY EVENING AFTER DINNER FOR CHOLESTEROL   • tiotropium (SPIRIVA HANDIHALER) 18 MCG capsule for inhaler INHALE 1 CAPSULE BY MOUTH (18 MCG) BY INHALATION ROUTE ONCE DAILY   • triamterene-hydrochlorothiazide (DYAZIDE) 37.5-25 MG per capsule TAKE ONE CAPSULE BY MOUTH ONE TIME DAILY FOR HIGH BLOOD PRESSURE   • fluticasone (FLONASE) 50 MCG/ACT nasal spray Spray in each nostril daily. 1-2 sprays per nostril once daily   • triamcinolone (ARISTOCORT) 0.1 % cream Apply sparingly to affected area twice daily as needed   • cholecalciferol (VITAMIN D3) 1000 UNITS tablet Take 1,000 Units by mouth daily.   • fexofenadine (ALLEGRA) 180 MG tablet 1/2 to 1 tab once daily as needed for allergies-- OTC     No current facility-administered medications for this visit.          PAST MEDICAL HISTORY:  Past Medical History:   Diagnosis Date   • Breast cancer screening by mammogram 2018   • COPD (chronic obstructive  pulmonary disease) (CMS/[facility])     spirometry- 09/13/2010   • DM (diabetes mellitus), type 2 (CMS/[facility])    • HTN (hypertension)    • Hyperlipidemia          PAST SURGICAL HISTORY:  No past surgical history on file.      FAMILY HISTORY:  No family history on file.      SOCIAL HISTORY:  Social History     Tobacco Use   • Smoking status: Former Smoker   • Smokeless tobacco: Never Used   Substance Use Topics   • Alcohol use: No     Frequency: Never   • Drug use: No       Past medical history, family medical history, surgical history and social history reviewed      OBJECTIVE:     Visit Vitals  /66   Pulse 73   Temp 97.5 °F (36.4 °C)   Resp 18   Ht 4' 11\" (1.499 m)   Wt 68.5 kg (151 lb 0.2 oz)   SpO2 100%   BMI 30.50 kg/m²         Physical Exam   Constitutional: She is oriented to person, place, and time. She appears well-developed and well-nourished.   HENT:   Head: Normocephalic and atraumatic.   Mouth/Throat: Oropharynx is clear and moist. No oropharyngeal exudate.   Eyes: Pupils are equal, round, and reactive to light. Right eye exhibits no discharge. Left eye exhibits no discharge.   Neck: Neck supple.   Cardiovascular: Normal rate and regular rhythm. Exam reveals no gallop and no friction rub.   No murmur heard.  Pulmonary/Chest: Effort normal and breath sounds normal. No respiratory distress. She has no wheezes.   Abdominal: Soft. Bowel sounds are normal. She exhibits no distension and no mass. There is no tenderness.   Musculoskeletal: Normal range of motion. She exhibits no edema.   Lymphadenopathy:     She has no cervical adenopathy.   Neurological: She is alert and oriented to person, place, and time.   Skin: No rash noted.   Psychiatric: She has a normal mood and affect.   Nursing note and vitals reviewed.          DIAGNOSTIC STUDIES:   LAB RESULTS:    No visits with results within 1 Month(s) from this visit.   Latest known visit with results is:   Office Visit on 03/14/2019   Component Date Value Ref  Range Status   • Glucose Blood, POC 03/14/2019 128  mg/dL Final       Assessment AND PLAN:   This is a 82 year old female who presents for CDM and lab review    Sept 2018 labs reviewed and discussed--CKD 2 stable, a1c 6.2 from 5.9, no anemia, otherwise all wnl   Dec 2018 labs reviewed and discussed--a1c unchanged, CKD 2 stable, otherwise all wnl  Feb 2019 labs reviewed and discussed--a1c worsened to 6.3 from 6.2, vit D low at 18.6, CKD-2 stable    1. Type 2 DM with CKD stage 2 and hypertension (CMS/[facility])    2. Vitamin D deficiency    3. Benign hypertension with CKD (chronic kidney disease), stage II    4. Essential hypertension    5. Uncontrolled hypertension    6. CKD (chronic kidney disease) stage 2, GFR 60-89 ml/min    7. Type 2 diabetes mellitus with hyperlipidemia (CMS/[facility])    8. Chronic obstructive pulmonary disease, unspecified COPD type (CMS/[facility])      DM with HTN and CKD-2  a. DM--a1c worsened to 6.3 from 6.2 per 2/2019 labs--goal of a1c <7  - Continue adherence to diabetic diet     b. HTN--fair control--goal bp <130/80  - Continue Triam/HCTZ 37.5-25 mg po qd and Amlodipine 2.5 mg 2 tab po qd   c.CKD 2--per 2/2019 labs  - Monitor; push fluids and avoid nephrotoxics     DM with HLD  HLD--controlled per 3/2018 labs--LDL goal <70  - Continue Simvastatin 20 mg po qd (consider not increased dose due to possible interaction with amlodipine     COPD--per PFT in 2010-- stable and controlled  - Continue Spiriva 18 mcg qd as directed and Proair prn     Primary Osteoarthritis of Bilateral Knees--stable/asymptomatic      vit D def- start 5000 units of vit D daily (pt feels 1000 units makes her hyper-- so not wanting 50k caps     RHM   - Mammo--last done Jan 2018, nml--repeat Jan 2019, referral given 12/20/18, reminded to schedule 4/16/19  - Colonoscopy--last done Aug 2010--repeat Aug 2020  - Ophthal--s/p R cataract surg--last saw Feb 2018, vitreous floaters bilat eyes--last saw 3/2019 per pt , nml per pt--f/u 9/2019  -  DEXA--last done 3/12/19, nml  - Shots--defers Tdap, pneumo-13, and shingles 4/16/19; VIS given    REMINDERS:  FOLLOW UP WITH DR. CADET in mid-July 2019  LABS DUE after next visit  KEEP/SCHEDULE APPT: ophthal, mammo,   EDUCATION: Tdap, pneumo-13, and shingles VIS given  NEXT VISIT: CDM      Instructions provided as documented in the AVS.      The Patient and/family or representative  indicated understanding of the diagnosis and agreed with the plan of care.        Scribe Attestation: Entered by Percy Manuel, acting as scribe for Dr. Sherly Cadet    Provider Attestation: The documentation recorded by the scribe accurately reflects the service I personally performed and the decisions made by me, Trey Cadet MD   8

## 2022-08-30 NOTE — ED PROVIDER NOTE - ATTENDING CONTRIBUTION TO CARE
I personally evaluated patient. I agree with the findings and plan with all documentation on chart except as documented  in my note.    55 y/o M PMHx Renal Cell Carcinoma s/p partial Nephrectomy, Kidney Stones, MDD presents to ED with complaints of left sided flank pain and a symptomatic kidney stone.  Patient reports symptoms began 5 days ago with painless hematuria that progressed to left flank the next day.  Patient reports the pain and hematuria subsided spontaneously the next day but left flank reoccured this AM and persisted.  patient describes the pain as sharp, constant and severe.  He reports he came to ED for evaluation.  No complaints of CP/SOB.  No fever chills.  No N/V.  No dysuria. On exam, VS reviewed. no CVA tenderness. Patient in pain. IV placed and labs sent, CT scan done.    Work up consistent with a symptomatic kidney stone.  Pain persisted in the Emergency Department despite NSAID and parental narcotics.  Labs, imaging and urine reviewed. Urology consulted for care.    ED work up reviewed and results and plan of care discussed with patient. Patient requires admission for further work up, monitoring, and management. Need for admission discussed with patient.

## 2022-08-30 NOTE — ED PROVIDER NOTE - OBJECTIVE STATEMENT
56M w/PMHx of RCC s/p partial Nephrectomy, Renal Stones, MDD presents to ED with complaints of left sided flank pain.  Patient reports symptoms began 5 days ago with painless hematuria that progressed to left flank the next day.  Patient reports the pain and hematuria subsided spontaneously the next day but left flank reoccured this AM and persisted.  patient describes the pain as sharp, constant and severe.  He reports he came to ED for evaluation.  No complaints of CP/SOB.  No fever chills.  No N/V.  No dysuria no trauma.

## 2022-08-31 LAB
ALBUMIN SERPL ELPH-MCNC: 3.9 G/DL — SIGNIFICANT CHANGE UP (ref 3.5–5.2)
ALP SERPL-CCNC: 69 U/L — SIGNIFICANT CHANGE UP (ref 30–115)
ALT FLD-CCNC: 16 U/L — SIGNIFICANT CHANGE UP (ref 0–41)
ANION GAP SERPL CALC-SCNC: 8 MMOL/L — SIGNIFICANT CHANGE UP (ref 7–14)
AST SERPL-CCNC: 17 U/L — SIGNIFICANT CHANGE UP (ref 0–41)
BILIRUB SERPL-MCNC: 0.3 MG/DL — SIGNIFICANT CHANGE UP (ref 0.2–1.2)
BUN SERPL-MCNC: 23 MG/DL — HIGH (ref 10–20)
CALCIUM SERPL-MCNC: 8.8 MG/DL — SIGNIFICANT CHANGE UP (ref 8.5–10.1)
CHLORIDE SERPL-SCNC: 103 MMOL/L — SIGNIFICANT CHANGE UP (ref 98–110)
CO2 SERPL-SCNC: 28 MMOL/L — SIGNIFICANT CHANGE UP (ref 17–32)
CREAT SERPL-MCNC: 1.5 MG/DL — SIGNIFICANT CHANGE UP (ref 0.7–1.5)
CULTURE RESULTS: NO GROWTH — SIGNIFICANT CHANGE UP
EGFR: 55 ML/MIN/1.73M2 — LOW
GLUCOSE SERPL-MCNC: 127 MG/DL — HIGH (ref 70–99)
HCT VFR BLD CALC: 39.8 % — LOW (ref 42–52)
HGB BLD-MCNC: 13.4 G/DL — LOW (ref 14–18)
MCHC RBC-ENTMCNC: 30.1 PG — SIGNIFICANT CHANGE UP (ref 27–31)
MCHC RBC-ENTMCNC: 33.7 G/DL — SIGNIFICANT CHANGE UP (ref 32–37)
MCV RBC AUTO: 89.4 FL — SIGNIFICANT CHANGE UP (ref 80–94)
NRBC # BLD: 0 /100 WBCS — SIGNIFICANT CHANGE UP (ref 0–0)
PLATELET # BLD AUTO: 181 K/UL — SIGNIFICANT CHANGE UP (ref 130–400)
POTASSIUM SERPL-MCNC: 4.9 MMOL/L — SIGNIFICANT CHANGE UP (ref 3.5–5)
POTASSIUM SERPL-SCNC: 4.9 MMOL/L — SIGNIFICANT CHANGE UP (ref 3.5–5)
PROT SERPL-MCNC: 6.5 G/DL — SIGNIFICANT CHANGE UP (ref 6–8)
RBC # BLD: 4.45 M/UL — LOW (ref 4.7–6.1)
RBC # FLD: 13 % — SIGNIFICANT CHANGE UP (ref 11.5–14.5)
SODIUM SERPL-SCNC: 139 MMOL/L — SIGNIFICANT CHANGE UP (ref 135–146)
SPECIMEN SOURCE: SIGNIFICANT CHANGE UP
WBC # BLD: 7.78 K/UL — SIGNIFICANT CHANGE UP (ref 4.8–10.8)
WBC # FLD AUTO: 7.78 K/UL — SIGNIFICANT CHANGE UP (ref 4.8–10.8)

## 2022-08-31 PROCEDURE — 52332 CYSTOSCOPY AND TREATMENT: CPT | Mod: LT

## 2022-08-31 PROCEDURE — 99231 SBSQ HOSP IP/OBS SF/LOW 25: CPT

## 2022-08-31 PROCEDURE — 99222 1ST HOSP IP/OBS MODERATE 55: CPT | Mod: 25

## 2022-08-31 RX ORDER — ONDANSETRON 8 MG/1
4 TABLET, FILM COATED ORAL EVERY 8 HOURS
Refills: 0 | Status: DISCONTINUED | OUTPATIENT
Start: 2022-08-31 | End: 2022-09-01

## 2022-08-31 RX ORDER — SODIUM CHLORIDE 9 MG/ML
1000 INJECTION, SOLUTION INTRAVENOUS
Refills: 0 | Status: DISCONTINUED | OUTPATIENT
Start: 2022-08-31 | End: 2022-08-31

## 2022-08-31 RX ORDER — TAMSULOSIN HYDROCHLORIDE 0.4 MG/1
0.4 CAPSULE ORAL AT BEDTIME
Refills: 0 | Status: DISCONTINUED | OUTPATIENT
Start: 2022-08-31 | End: 2022-09-01

## 2022-08-31 RX ORDER — ONDANSETRON 8 MG/1
4 TABLET, FILM COATED ORAL ONCE
Refills: 0 | Status: DISCONTINUED | OUTPATIENT
Start: 2022-08-31 | End: 2022-08-31

## 2022-08-31 RX ORDER — ACETAMINOPHEN 500 MG
650 TABLET ORAL EVERY 6 HOURS
Refills: 0 | Status: DISCONTINUED | OUTPATIENT
Start: 2022-08-31 | End: 2022-09-01

## 2022-08-31 RX ORDER — CEFAZOLIN SODIUM 1 G
1000 VIAL (EA) INJECTION EVERY 8 HOURS
Refills: 0 | Status: DISCONTINUED | OUTPATIENT
Start: 2022-08-31 | End: 2022-09-01

## 2022-08-31 RX ORDER — MORPHINE SULFATE 50 MG/1
4 CAPSULE, EXTENDED RELEASE ORAL
Refills: 0 | Status: DISCONTINUED | OUTPATIENT
Start: 2022-08-31 | End: 2022-08-31

## 2022-08-31 RX ORDER — ESCITALOPRAM OXALATE 10 MG/1
10 TABLET, FILM COATED ORAL DAILY
Refills: 0 | Status: DISCONTINUED | OUTPATIENT
Start: 2022-08-31 | End: 2022-09-01

## 2022-08-31 RX ORDER — KETOROLAC TROMETHAMINE 30 MG/ML
15 SYRINGE (ML) INJECTION ONCE
Refills: 0 | Status: DISCONTINUED | OUTPATIENT
Start: 2022-08-31 | End: 2022-08-31

## 2022-08-31 RX ADMIN — Medication 15 MILLIGRAM(S): at 04:53

## 2022-08-31 RX ADMIN — Medication 15 MILLIGRAM(S): at 12:08

## 2022-08-31 RX ADMIN — Medication 15 MILLIGRAM(S): at 05:49

## 2022-08-31 RX ADMIN — SODIUM CHLORIDE 125 MILLILITER(S): 9 INJECTION INTRAMUSCULAR; INTRAVENOUS; SUBCUTANEOUS at 04:54

## 2022-08-31 RX ADMIN — ESCITALOPRAM OXALATE 10 MILLIGRAM(S): 10 TABLET, FILM COATED ORAL at 16:47

## 2022-08-31 RX ADMIN — MORPHINE SULFATE 4 MILLIGRAM(S): 50 CAPSULE, EXTENDED RELEASE ORAL at 05:25

## 2022-08-31 RX ADMIN — SODIUM CHLORIDE 125 MILLILITER(S): 9 INJECTION, SOLUTION INTRAVENOUS at 14:49

## 2022-08-31 RX ADMIN — Medication 15 MILLIGRAM(S): at 11:16

## 2022-08-31 RX ADMIN — Medication 100 MILLIGRAM(S): at 22:36

## 2022-08-31 RX ADMIN — Medication 15 MILLIGRAM(S): at 21:42

## 2022-08-31 RX ADMIN — MORPHINE SULFATE 4 MILLIGRAM(S): 50 CAPSULE, EXTENDED RELEASE ORAL at 05:49

## 2022-08-31 RX ADMIN — TAMSULOSIN HYDROCHLORIDE 0.4 MILLIGRAM(S): 0.4 CAPSULE ORAL at 21:42

## 2022-08-31 RX ADMIN — Medication 15 MILLIGRAM(S): at 22:00

## 2022-08-31 NOTE — CONSULT NOTE ADULT - SUBJECTIVE AND OBJECTIVE BOX
JAMIE VALDEZ  56y  Male      Patient is a 56y old  Male who presents with a chief complaint of Left flank pain and hematuria x 4-5 days (30 Aug 2022 17:39)      56M w/PMHx of RCC s/p partial Nephrectomy, Renal Stones, MDD, KENDY on CPAP presents to ED with complaints of left sided flank pain.  Patient reports symptoms started 5 days ago with painless hematuria that progressed to left flank the next day.  On my eval, he reports he is comfortable and left flank pain is well controlled. Medicine was consulted for pre-op risk stratification prior to OR tomorrow for stone removal.      REVIEW OF SYSTEMS:  CONSTITUTIONAL: No fever, weight loss, or fatigue  EYES: No eye pain, visual disturbances, or discharge  ENMT:  No difficulty hearing, tinnitus, vertigo; No sinus or throat pain  NECK: No pain or stiffness  RESPIRATORY: No cough, wheezing, chills or hemoptysis; No shortness of breath  CARDIOVASCULAR: No chest pain, palpitations, dizziness, or leg swelling  GASTROINTESTINAL: No abdominal or epigastric pain. No nausea, vomiting, or hematemesis; No diarrhea or constipation. No melena or hematochezia.  GENITOURINARY: No dysuria, frequency, hematuria, or incontinence, +left flank pain  NEUROLOGICAL: No headaches, memory loss, loss of strength, numbness, or tremors  MUSCULOSKELETAL: No joint pain or swelling; No muscle, back, or extremity pain        T(C): 35.6 (08-30-22 @ 20:45), Max: 37.1 (08-30-22 @ 12:13)  HR: 55 (08-30-22 @ 20:45) (55 - 66)  BP: 165/88 (08-30-22 @ 20:45) (144/65 - 188/96)  RR: 18 (08-30-22 @ 20:45) (18 - 20)  SpO2: 97% (08-30-22 @ 19:14) (97% - 97%)    PHYSICAL EXAM:  GENERAL: NAD, obese  HEAD:  Atraumatic, Normocephalic  EYES: EOMI, PERRLA, conjunctiva and sclera clear  ENMT: No tonsillar erythema, exudates, or enlargement; Moist mucous membranes  NECK: Supple, No JVD, Normal thyroid  NERVOUS SYSTEM:  Alert & Oriented X3, Good concentration; Motor Strength 5/5 B/L upper and lower extremities  CHEST/LUNG: Clear to percussion bilaterally; No rales, rhonchi, wheezing, or rubs  HEART: Regular rate and rhythm; No murmurs, rubs, or gallops  ABDOMEN: Soft, Nontender, Nondistended; Bowel sounds present  EXTREMITIES:  2+ Peripheral Pulses, No clubbing, cyanosis, or edema      Consultant(s) Notes Reviewed:  [x ] YES  [ ] NO  Care Discussed with Consultants/Other Providers [ x] YES  [ ] NO    LAB:                        14.7   7.52  )-----------( 212      ( 30 Aug 2022 13:30 )             43.6     08-30    139  |  102  |  21<H>  ----------------------------<  198<H>  4.5   |  28  |  1.3    Ca    9.4      30 Aug 2022 13:30    TPro  7.3  /  Alb  4.5  /  TBili  0.4  /  DBili  x   /  AST  16  /  ALT  19  /  AlkPhos  80  08-30    LIVER FUNCTIONS - ( 30 Aug 2022 13:30 )  Alb: 4.5 g/dL / Pro: 7.3 g/dL / ALK PHOS: 80 U/L / ALT: 19 U/L / AST: 16 U/L / GGT: x                       Drug Dosing Weight  Height (cm): 203.2 (30 Aug 2022 12:13)  Weight (kg): 138.3 (30 Aug 2022 12:13)  BMI (kg/m2): 33.5 (30 Aug 2022 12:13)  BSA (m2): 2.75 (30 Aug 2022 12:13)  Height (cm): 203.2 (08-30-22 @ 12:13)  Weight (kg): 138.3 (08-30-22 @ 12:13)  BMI (kg/m2): 33.5 (08-30-22 @ 12:13)  BSA (m2): 2.75 (08-30-22 @ 12:13)  CAPILLARY BLOOD GLUCOSE        I&O's Summary    Urinalysis Basic - ( 30 Aug 2022 13:30 )    Color: Yellow / Appearance: Clear / SG: >=1.030 / pH: x  Gluc: x / Ketone: Negative  / Bili: Negative / Urobili: 0.2 mg/dL   Blood: x / Protein: Trace mg/dL / Nitrite: Negative   Leuk Esterase: Negative / RBC: 11-25 /HPF / WBC 3-5 /HPF   Sq Epi: x / Non Sq Epi: Occasional /HPF / Bacteria: Ocasional /HPF        RADIOLOGY & ADDITIONAL TESTS:  Imaging Personally Reviewed:  [x] YES  [ ] NO    HEALTH ISSUES - PROBLEM Dx:          MEDS:  acetaminophen     Tablet .. 650 milliGRAM(s) Oral every 6 hours PRN  escitalopram 10 milliGRAM(s) Oral daily  ketorolac   Injectable 15 milliGRAM(s) IV Push every 6 hours PRN  melatonin 3 milliGRAM(s) Oral at bedtime PRN  morphine  - Injectable 4 milliGRAM(s) IV Push every 4 hours PRN  ondansetron Injectable 4 milliGRAM(s) IV Push every 8 hours PRN  sodium chloride 0.9%. 1000 milliLiter(s) IV Continuous <Continuous>  tamsulosin 0.4 milliGRAM(s) Oral at bedtime

## 2022-08-31 NOTE — BRIEF OPERATIVE NOTE - OPERATION/FINDINGS
cloudy bloody urine once the wire went up, left renal aspirate clear so this may be old blood  trilobar intrusion

## 2022-08-31 NOTE — BRIEF OPERATIVE NOTE - NSICDXBRIEFPOSTOP_GEN_ALL_CORE_FT
POST-OP DIAGNOSIS:  Left renal stone 31-Aug-2022 14:53:27  Antwon Rivers  Left ureteral stone 31-Aug-2022 14:53:33  Antwon Rivers  Acute pyonephrosis 31-Aug-2022 14:53:42  Antwon Rivers

## 2022-08-31 NOTE — CHART NOTE - NSCHARTNOTEFT_GEN_A_CORE
Patient seen and examined at bedside post-op.  Patient reports feeling well, no flank pain.  Afebrile.  Tolerating diet.    Vital Signs (24 Hrs):  T(C): 35.9 (08-31-22 @ 15:58), Max: 36.7 (08-31-22 @ 15:25)  HR: 51 (08-31-22 @ 15:58) (51 - 69)  BP: 163/84 (08-31-22 @ 15:58) (144/65 - 177/74)  RR: 16 (08-31-22 @ 15:58) (12 - 24)  SpO2: 99% (08-31-22 @ 15:25) (97% - 99%)    #Left sided ureteral stone s/p cysto w/stent placement POD #0  - patient with pyonephrosis, started on Ancef.    - plan for discharge tomorrow on PO ABX (Cefuroxime) if patient afebrile and labs stable in AM  - d/w attending Patient seen and examined at bedside post-op.  Patient reports feeling well, no flank pain.  Afebrile.  Tolerating diet.    Vital Signs (24 Hrs):  T(C): 35.9 (08-31-22 @ 15:58), Max: 36.7 (08-31-22 @ 15:25)  HR: 51 (08-31-22 @ 15:58) (51 - 69)  BP: 163/84 (08-31-22 @ 15:58) (144/65 - 177/74)  RR: 16 (08-31-22 @ 15:58) (12 - 24)  SpO2: 99% (08-31-22 @ 15:25) (97% - 99%)    #Left sided ureteral stone s/p cysto w/stent placement POD #0  - patient with pyonephrosis, started on Ancef.    - plan for discharge tomorrow on PO ABX (Cefuroxime) if patient afebrile and labs stable in AM  - d/w attending    agree with above

## 2022-08-31 NOTE — PROGRESS NOTE ADULT - ASSESSMENT
56M w/PMHx of RCC s/p partial Nephrectomy, Renal Stones, MDD presents to ED with complaints of left sided flank pain, admitted to surgery service for further management    #Obs left mid-uretral calculs    - maintain NPO  - For OR today  - AM labs reviewed  - no need for ABX  - pain control  - c/w IVF/Tamsulosin  - strain urine  - will d/w attending 56M w/PMHx of RCC s/p partial Nephrectomy, Renal Stones, MDD presents to ED with complaints of left sided flank pain, admitted to surgery service for further management    #Obs left mid-uretral calculs    - maintain NPO  - For OR today  - AM labs reviewed  - no need for ABX  - pain control  - c/w IVF/Tamsulosin  - strain urine  - will d/w attending    pt seen in Pre op and pain better  but has not passed the stone we will proceed to cysto

## 2022-08-31 NOTE — CONSULT NOTE ADULT - ASSESSMENT
56M w/PMHx of RCC s/p partial Nephrectomy, Renal Stones, MDD, KENDY on CPAP presents to ED with complaints of left sided flank pain. Medicine was consulted for pre-op risk stratification prior to OR tomorrow for stone removal.    #Left sided obstructing mid-uretral calcli with mild to moderate left hydroureteronephrosis  #Hx of RCC s/p partial Nephrectomy  #Hx of Renal Stones  #MDD  #KENDY on CPAP  -METS > 4  -no significant current medical co-morbidities; labs and vitals wnl  PLAN  -NPO, IVF, pain control  -recommend incentive spirometer   -plan for OR tomorrow  -patient is at low to moderate risk for low to moderate risk procedure    Medicine will sign off. Please call us with any questions.

## 2022-09-01 ENCOUNTER — TRANSCRIPTION ENCOUNTER (OUTPATIENT)
Age: 57
End: 2022-09-01

## 2022-09-01 VITALS
HEART RATE: 65 BPM | DIASTOLIC BLOOD PRESSURE: 65 MMHG | RESPIRATION RATE: 16 BRPM | TEMPERATURE: 97 F | SYSTOLIC BLOOD PRESSURE: 147 MMHG

## 2022-09-01 LAB
ALBUMIN SERPL ELPH-MCNC: 4.2 G/DL — SIGNIFICANT CHANGE UP (ref 3.5–5.2)
ALP SERPL-CCNC: 73 U/L — SIGNIFICANT CHANGE UP (ref 30–115)
ALT FLD-CCNC: 17 U/L — SIGNIFICANT CHANGE UP (ref 0–41)
ANION GAP SERPL CALC-SCNC: 11 MMOL/L — SIGNIFICANT CHANGE UP (ref 7–14)
AST SERPL-CCNC: 15 U/L — SIGNIFICANT CHANGE UP (ref 0–41)
BILIRUB SERPL-MCNC: 0.4 MG/DL — SIGNIFICANT CHANGE UP (ref 0.2–1.2)
BUN SERPL-MCNC: 26 MG/DL — HIGH (ref 10–20)
CALCIUM SERPL-MCNC: 9.1 MG/DL — SIGNIFICANT CHANGE UP (ref 8.5–10.1)
CHLORIDE SERPL-SCNC: 103 MMOL/L — SIGNIFICANT CHANGE UP (ref 98–110)
CO2 SERPL-SCNC: 26 MMOL/L — SIGNIFICANT CHANGE UP (ref 17–32)
CREAT SERPL-MCNC: 1.2 MG/DL — SIGNIFICANT CHANGE UP (ref 0.7–1.5)
EGFR: 72 ML/MIN/1.73M2 — SIGNIFICANT CHANGE UP
GLUCOSE SERPL-MCNC: 165 MG/DL — HIGH (ref 70–99)
HCT VFR BLD CALC: 40.7 % — LOW (ref 42–52)
HGB BLD-MCNC: 13.8 G/DL — LOW (ref 14–18)
MCHC RBC-ENTMCNC: 30.1 PG — SIGNIFICANT CHANGE UP (ref 27–31)
MCHC RBC-ENTMCNC: 33.9 G/DL — SIGNIFICANT CHANGE UP (ref 32–37)
MCV RBC AUTO: 88.7 FL — SIGNIFICANT CHANGE UP (ref 80–94)
NRBC # BLD: 0 /100 WBCS — SIGNIFICANT CHANGE UP (ref 0–0)
PLATELET # BLD AUTO: 213 K/UL — SIGNIFICANT CHANGE UP (ref 130–400)
POTASSIUM SERPL-MCNC: 4.7 MMOL/L — SIGNIFICANT CHANGE UP (ref 3.5–5)
POTASSIUM SERPL-SCNC: 4.7 MMOL/L — SIGNIFICANT CHANGE UP (ref 3.5–5)
PROT SERPL-MCNC: 6.9 G/DL — SIGNIFICANT CHANGE UP (ref 6–8)
RBC # BLD: 4.59 M/UL — LOW (ref 4.7–6.1)
RBC # FLD: 12.8 % — SIGNIFICANT CHANGE UP (ref 11.5–14.5)
SODIUM SERPL-SCNC: 140 MMOL/L — SIGNIFICANT CHANGE UP (ref 135–146)
WBC # BLD: 11.25 K/UL — HIGH (ref 4.8–10.8)
WBC # FLD AUTO: 11.25 K/UL — HIGH (ref 4.8–10.8)

## 2022-09-01 RX ORDER — CEFUROXIME AXETIL 250 MG
1 TABLET ORAL
Qty: 10 | Refills: 0
Start: 2022-09-01 | End: 2022-09-05

## 2022-09-01 RX ADMIN — Medication 100 MILLIGRAM(S): at 05:06

## 2022-09-01 RX ADMIN — Medication 650 MILLIGRAM(S): at 10:49

## 2022-09-01 RX ADMIN — Medication 650 MILLIGRAM(S): at 10:13

## 2022-09-01 NOTE — DISCHARGE NOTE PROVIDER - HOSPITAL COURSE
#Left sided ureteral stone s/p cysto w/stent placement POD #1 afeb.  - patient with pyonephrosis, started on Ancef.    - plan is for discharge today on PO ABX (Cefuroxime)       PROCEDURES:  Cystoscopy with stent placement 31-Aug-2022 14:52:44 left Antwon Rivers       Operative Findings:  · Operative Findings  cloudy bloody urine once the wire went up, left renal aspirate clear so this may be old blood  trilobar intrusion       #Left sided ureteral stone s/p cysto w/stent placement POD #1 afeb.  - patient with pyonephrosis, started on Ancef.    - plan is for discharge today on PO ABX (Cefuroxime)       PROCEDURES:  Cystoscopy with stent placement 31-Aug-2022 14:52:44 left Antwon Rivers       Operative Findings:  · Operative Findings  cloudy bloody urine once the wire went up, left renal aspirate clear so this may be old blood  trilobar intrusion      pod 1   afeb   anton diet

## 2022-09-01 NOTE — DISCHARGE NOTE PROVIDER - CARE PROVIDER_API CALL
Preston Jorge)  Family Medicine  [address] [place] [address]  Phone: [phone]  Fax: [phone]  Established Patient  Follow Up Time: 1-3 days

## 2022-09-01 NOTE — PROGRESS NOTE ADULT - SUBJECTIVE AND OBJECTIVE BOX
POD 1  left  Cystoscopy with stent placement        Operative Findings:  · Operative Findings	cloudy bloody urine once the wire went up, left renal aspirate clear so this may be old blood  trilobar intrusion      pt seen doing well   anton diet  min left flank pain   denies cp / sob / fever/ chills    Vital Signs Last 24 Hrs  T(C): 36.2 (01 Sep 2022 00:27), Max: 36.7 (31 Aug 2022 15:25)  T(F): 97.2 (01 Sep 2022 00:27), Max: 98 (31 Aug 2022 15:25)  HR: 58 (01 Sep 2022 00:27) (51 - 69)  BP: 126/63 (01 Sep 2022 00:27) (126/63 - 177/74)  BP(mean): --  RR: 16 (01 Sep 2022 00:27) (12 - 24)  SpO2: 99% (31 Aug 2022 15:25) (97% - 99%)    Parameters below as of 31 Aug 2022 15:25  Patient On (Oxygen Delivery Method): room air    PE:  chest: cta b/l  cardio: s1s2 heard  abd: bs+ ntnd    09-01    140  |  103  |  26<H>  ----------------------------<  165<H>  4.7   |  26  |  1.2    Ca    9.1      01 Sep 2022 07:24    TPro  6.9  /  Alb  4.2  /  TBili  0.4  /  DBili  x   /  AST  15  /  ALT  17  /  AlkPhos  73  09-01                            13.8   11.25 )-----------( 213      ( 01 Sep 2022 07:24 )             40.7     CAPILLARY BLOOD GLUCOSE      a/p    POD 1  left  Cystoscopy with stent placement     stable   f/u cx as OP   d/c home with po abx     d/w dr barakat        
Patient seen and examined at bedside, resting comfortably.  Afebrile, pain well controlled.  For OR later today.    Vital Signs Last 24 Hrs  T(C): 36 (31 Aug 2022 09:15), Max: 37.1 (30 Aug 2022 12:13)  T(F): 96.8 (31 Aug 2022 09:15), Max: 98.8 (30 Aug 2022 12:13)  HR: 53 (31 Aug 2022 09:15) (53 - 66)  BP: 154/80 (31 Aug 2022 09:15) (144/65 - 188/96)  RR: 18 (31 Aug 2022 09:15) (18 - 20)  SpO2: 97% (30 Aug 2022 19:14) (97% - 97%)    Parameters below as of 30 Aug 2022 12:13  Patient On (Oxygen Delivery Method): room air    General Appearance: NAD  Abdomen: Soft, NT/ND,+bs,  no rebound/gaurding; no CVA tenderness b/l  CNS: A/O x 3    I&O's Summary    I&O's Detail      MEDICATIONS  (STANDING):  escitalopram 10 milliGRAM(s) Oral daily  sodium chloride 0.9%. 1000 milliLiter(s) (125 mL/Hr) IV Continuous <Continuous>  tamsulosin 0.4 milliGRAM(s) Oral at bedtime    MEDICATIONS  (PRN):  acetaminophen     Tablet .. 650 milliGRAM(s) Oral every 6 hours PRN Temp greater or equal to 38C (100.4F), Mild Pain (1 - 3)  ketorolac   Injectable 15 milliGRAM(s) IV Push every 6 hours PRN Moderate Pain (4 - 6)  melatonin 3 milliGRAM(s) Oral at bedtime PRN Insomnia  morphine  - Injectable 4 milliGRAM(s) IV Push every 4 hours PRN Severe Pain (7 - 10)  ondansetron Injectable 4 milliGRAM(s) IV Push every 8 hours PRN Nausea and/or Vomiting      LABS:                        13.4   7.78  )-----------( 181      ( 31 Aug 2022 07:24 )             39.8     08-31    139  |  103  |  23<H>  ----------------------------<  127<H>  4.9   |  28  |  1.5    Ca    8.8      31 Aug 2022 07:24    TPro  6.5  /  Alb  3.9  /  TBili  0.3  /  DBili  x   /  AST  17  /  ALT  16  /  AlkPhos  69  08-31      Urinalysis Basic - ( 30 Aug 2022 13:30 )    Color: Yellow / Appearance: Clear / SG: >=1.030 / pH: x  Gluc: x / Ketone: Negative  / Bili: Negative / Urobili: 0.2 mg/dL   Blood: x / Protein: Trace mg/dL / Nitrite: Negative   Leuk Esterase: Negative / RBC: 11-25 /HPF / WBC 3-5 /HPF   Sq Epi: x / Non Sq Epi: Occasional /HPF / Bacteria: Ocasional /HPF        RADIOLOGY & ADDITIONAL STUDIES: Reviewed

## 2022-09-01 NOTE — DISCHARGE NOTE PROVIDER - NSDCMRMEDTOKEN_GEN_ALL_CORE_FT
cefuroxime 500 mg oral tablet: 1 tab(s) orally 2 times a day   escitalopram 10 mg oral tablet: 1 tab(s) orally once a day

## 2022-09-01 NOTE — DISCHARGE NOTE NURSING/CASE MANAGEMENT/SOCIAL WORK - PATIENT PORTAL LINK FT
You can access the FollowMyHealth Patient Portal offered by [facility] by registering at the following website: [email or web address]. By joining [employer]’s FollowMyHealth portal, you will also be able to view your health information using other applications (apps) compatible with our system.

## 2022-09-01 NOTE — DISCHARGE NOTE PROVIDER - NSDCCPCAREPLAN_GEN_ALL_CORE_FT
PRINCIPAL DISCHARGE DIAGNOSIS  Diagnosis: Kidney stone  Assessment and Plan of Treatment: a stent was placed in the ureter to allow urine to flow properly   the stone and stent will be further taken care of .  follow up with urologist   take antibiotics as directed

## 2022-09-01 NOTE — DISCHARGE NOTE NURSING/CASE MANAGEMENT/SOCIAL WORK - NSDCPEFALRISK_GEN_ALL_CORE
For information on Fall & Injury Prevention, visit: [email or web address] OR  [email or web address] OR  https://www.cdc.gov/steadi/patient.html

## 2022-09-01 NOTE — DISCHARGE NOTE PROVIDER - NSDCFUSCHEDAPPT_GEN_ALL_CORE_FT
Antwon Rivers  [facility] Physician [facility]  UROLOGY [address]  Scheduled Appointment: 09/13/2022

## 2022-09-02 ENCOUNTER — APPOINTMENT (OUTPATIENT)
Dept: UROLOGY | Facility: CLINIC | Age: 57
End: 2022-09-02

## 2022-09-12 DIAGNOSIS — N13.6 PYONEPHROSIS: ICD-10-CM

## 2022-09-12 DIAGNOSIS — G47.33 OBSTRUCTIVE SLEEP APNEA (ADULT) (PEDIATRIC): ICD-10-CM

## 2022-09-12 DIAGNOSIS — Z20.822 CONTACT WITH AND (SUSPECTED) EXPOSURE TO COVID-19: ICD-10-CM

## 2022-09-12 DIAGNOSIS — Z87.891 PERSONAL HISTORY OF NICOTINE DEPENDENCE: ICD-10-CM

## 2022-09-12 DIAGNOSIS — Z85.528 PERSONAL HISTORY OF OTHER MALIGNANT NEOPLASM OF KIDNEY: ICD-10-CM

## 2022-09-12 DIAGNOSIS — F32.9 MAJOR DEPRESSIVE DISORDER, SINGLE EPISODE, UNSPECIFIED: ICD-10-CM

## 2022-09-12 DIAGNOSIS — R10.9 UNSPECIFIED ABDOMINAL PAIN: ICD-10-CM

## 2022-09-17 ENCOUNTER — LABORATORY RESULT (OUTPATIENT)
Age: 57
End: 2022-09-17

## 2022-09-20 ENCOUNTER — TRANSCRIPTION ENCOUNTER (OUTPATIENT)
Age: 57
End: 2022-09-20

## 2022-09-20 ENCOUNTER — OUTPATIENT (OUTPATIENT)
Dept: OUTPATIENT SERVICES | Facility: HOSPITAL | Age: 57
LOS: 1 days | Discharge: HOME | End: 2022-09-20
Payer: COMMERCIAL

## 2022-09-20 ENCOUNTER — APPOINTMENT (OUTPATIENT)
Dept: UROLOGY | Facility: HOSPITAL | Age: 57
End: 2022-09-20

## 2022-09-20 VITALS
RESPIRATION RATE: 18 BRPM | OXYGEN SATURATION: 95 % | WEIGHT: 300.05 LBS | HEIGHT: 78 IN | SYSTOLIC BLOOD PRESSURE: 144 MMHG | HEART RATE: 72 BPM | DIASTOLIC BLOOD PRESSURE: 77 MMHG | TEMPERATURE: 97 F

## 2022-09-20 VITALS — DIASTOLIC BLOOD PRESSURE: 85 MMHG | RESPIRATION RATE: 16 BRPM | SYSTOLIC BLOOD PRESSURE: 162 MMHG | HEART RATE: 62 BPM

## 2022-09-20 DIAGNOSIS — Z90.49 ACQUIRED ABSENCE OF OTHER SPECIFIED PARTS OF DIGESTIVE TRACT: Chronic | ICD-10-CM

## 2022-09-20 DIAGNOSIS — Z85.528 PERSONAL HISTORY OF OTHER MALIGNANT NEOPLASM OF KIDNEY: Chronic | ICD-10-CM

## 2022-09-20 DIAGNOSIS — Z90.5 ACQUIRED ABSENCE OF KIDNEY: Chronic | ICD-10-CM

## 2022-09-20 DIAGNOSIS — N20.0 CALCULUS OF KIDNEY: Chronic | ICD-10-CM

## 2022-09-20 DIAGNOSIS — Z98.890 OTHER SPECIFIED POSTPROCEDURAL STATES: Chronic | ICD-10-CM

## 2022-09-20 PROCEDURE — 52356 CYSTO/URETERO W/LITHOTRIPSY: CPT | Mod: LT

## 2022-09-20 PROCEDURE — 52353 CYSTOURETERO W/LITHOTRIPSY: CPT | Mod: LT,XS

## 2022-09-20 RX ORDER — HYDROMORPHONE HYDROCHLORIDE 2 MG/ML
0.5 INJECTION INTRAMUSCULAR; INTRAVENOUS; SUBCUTANEOUS
Refills: 0 | Status: DISCONTINUED | OUTPATIENT
Start: 2022-09-20 | End: 2022-09-20

## 2022-09-20 RX ORDER — ACETAMINOPHEN 500 MG
650 TABLET ORAL ONCE
Refills: 0 | Status: COMPLETED | OUTPATIENT
Start: 2022-09-20 | End: 2022-09-20

## 2022-09-20 RX ORDER — SODIUM CHLORIDE 9 MG/ML
1000 INJECTION, SOLUTION INTRAVENOUS
Refills: 0 | Status: DISCONTINUED | OUTPATIENT
Start: 2022-09-20 | End: 2022-09-20

## 2022-09-20 RX ORDER — ESCITALOPRAM OXALATE 10 MG/1
1 TABLET, FILM COATED ORAL
Qty: 0 | Refills: 0 | DISCHARGE

## 2022-09-20 RX ADMIN — Medication 650 MILLIGRAM(S): at 16:37

## 2022-09-20 RX ADMIN — Medication 650 MILLIGRAM(S): at 16:50

## 2022-09-20 RX ADMIN — SODIUM CHLORIDE 75 MILLILITER(S): 9 INJECTION, SOLUTION INTRAVENOUS at 16:37

## 2022-09-20 NOTE — BRIEF OPERATIVE NOTE - NSICDXBRIEFPREOP_GEN_ALL_CORE_FT
PRE-OP DIAGNOSIS:  Left renal stone 20-Sep-2022 16:28:18  Antwon Rivers  Left ureteral stone 20-Sep-2022 16:28:25  Antwon Rivers

## 2022-09-20 NOTE — ASU DISCHARGE PLAN (ADULT/PEDIATRIC) - CARE PROVIDER_API CALL
Antwon Rivers)  Urology  [address] [place] [address] [address] [place] [address]  Phone: [phone]  Fax: [phone]  Scheduled Appointment: 09/22/2022 09:00 AM

## 2022-09-20 NOTE — BRIEF OPERATIVE NOTE - NSICDXBRIEFPROCEDURE_GEN_ALL_CORE_FT
PROCEDURES:  Ureteroscopy, with laser lithotripsy and stent placement 20-Sep-2022 16:21:32 left ureteral Antwon Rivers  Lithotripsy, with ureteroscopy 20-Sep-2022 16:21:59 left renal Antwon Rivers

## 2022-09-20 NOTE — BRIEF OPERATIVE NOTE - NSICDXBRIEFPOSTOP_GEN_ALL_CORE_FT
POST-OP DIAGNOSIS:  Left renal stone 20-Sep-2022 16:28:34  Antwon Rivers  Left ureteral stone 20-Sep-2022 16:28:40  Antwon Rivers

## 2022-09-20 NOTE — ASU PATIENT PROFILE, ADULT - PRO MENTAL HEALTH SX RECENT
rePULMONARY FOLLOW-UP NOTE    Subjective    Ms. Hightower is a 67 year old female  who has karla, pulmonary nodule, copd.     Chief Complaint:    Chief Complaint   Patient presents with   • Office Visit     6 mo fu, COPD   DME.[facility]          HPI:  Here today for follow-up visit.  Her last office visit was with Dr. Camacho 5/27/2021.    Has been doing home exercises. Has tonal gym at home. Doing stretches.  Less exercise since covid.      COPD: Feeling ok.  Trying to get back into exercising regularly.  Taking Symbicort once daily.  Incruse regularly.  Needs symbicort only.  Cannot tolerate generic.     KARLA:  Wearing cpap device.  Needs new machine.     Patient had a repeat CT chest 7/10/2019.  Stable 3 mm right middle lobe fissural nodule.  Stable to decrease scarring and subsegmental atelectasis elsewhere in the lungs.  Small hiatal hernia, left anterior chest wall subcutaneous nodule may represent sebaceous cyst. She is quit smoking greater than 15 years ago.  These nodules have been c radiographically stable since 2018.  No further work-up required    HISTORY:     Patient Active Problem List   Diagnosis   • Abnormal CT of liver   • Abnormal CT of the chest   • Abnormal mammogram   • Abnormal stress echocardiogram   • Arthritis of lumbar spine   • Osteoarthritis   • Mild intermittent asthma without complication   • Blood in urine   • BPPV (benign paroxysmal positional vertigo)   • Bunion, left   • Bunion, right   • Chronic GERD   • Current mild episode of major depressive disorder without prior episode (CMS/HCC)   • Hyperlipidemia   • Hypothyroidism, postradioiodine therapy   • Graves' disease   • Thyroid eye disease   • Former smoker   • Hemochromatosis carrier   • Liver cyst   • Obstructive sleep apnea, adult   • Recovering alcoholic in remission (CMS/HCC)   • Left arm pain   • Hyperplastic polyp of descending colon   • Fatigue   • COPD (chronic obstructive pulmonary disease) (CMS/HCC)   • Family history of  premature coronary artery disease   • HFE-related hemochromatosis 1 (CMS/HCC)   • Incidental lung nodule   • Onychomycosis   • Thyroid antibody positive   • Skin lesion of scalp   • Vascular calcification   • Vertigo   • Annual physical exam   • Atherosclerosis of native coronary artery of native heart without angina pectoris   • Kamara's esophagus   • Influenza vaccination declined by patient   • Vaccine counseling       Past Medical History:   Diagnosis Date   • Anxiety    • COPD (chronic obstructive pulmonary disease) (CMS/HCC)    • Dizziness    • Esophagitis    • GERD (gastroesophageal reflux disease)    • Graves disease    • Hashimoto's disease    • Hypercholesterolemia    • Nontraumatic incomplete tear of left rotator cuff 2015   • Obstructive sleep apnea on CPAP        Past Surgical History:   Procedure Laterality Date   • Egd     • Foot surgery     • Induced      • Thyroid ablation         Family History   Problem Relation Age of Onset   • Depression Mother    • Liver Disease Mother    • Heart disease Father    • Cancer, Breast Other    • Hypertension Other    • Thyroid Daughter         Hashimoto       Social History     Tobacco Use   • Smoking status: Former Smoker     Packs/day: 2.00     Years: 30.00     Pack years: 60.00     Types: Cigarettes     Quit date:      Years since quittin.5   • Smokeless tobacco: Never Used   Vaping Use   • Vaping Use: never used   Substance Use Topics   • Alcohol use: Not Currently   • Drug use: No       MEDS:    Current Outpatient Medications   Medication Sig Dispense Refill   • albuterol 108 (90 Base) MCG/ACT inhaler INHALE 2 PUFFS BY MOUTH INTO THE LUNGS EVERY 4 HOURS AS NEEDED FOR SHORTNESS OF BREATH OR WHEEZING 18 g 0   • Incruse Ellipta 62.5 MCG/INH inhaler Inhale 1 puff by mouth once daily 1 each 3   • meclizine (ANTIVERT) 25 MG tablet TAKE 1 TABLET BY MOUTH THREE TIMES DAILY AS NEEDED FOR DIZZINESS 10 tablet 0   • gabapentin (NEURONTIN) 100 MG  capsule Take 100 mg by mouth 3 times daily.     • FLUoxetine (PROzac) 20 MG capsule Take 1 capsule by mouth 2 times daily. 180 capsule 1   • atorvastatin (LIPITOR) 10 MG tablet Take 1 tablet by mouth daily. 90 tablet 1   • budesonide-formoterol (Symbicort) 160-4.5 MCG/ACT inhaler Inhale 2 puffs into the lungs daily. 10.2 g 3   • tiotropium (Spiriva Respimat) 2.5 MCG/ACT inhaler Inhale 2 puffs into the lungs daily. 4 g 11   • fluticasone (FLONASE) 50 MCG/ACT nasal spray Use 2 spray(s) in each nostril once daily (Patient taking differently: as needed.) 16 g 1   • Multiple Vitamins-Minerals (ZINC PO) Take by mouth daily.     • Omega-3 Fatty Acids (OMEGA-3 FISH OIL PO) Take by mouth daily.     • Cholecalciferol (Vitamin D) 50 mcg (2,000 units) tablet Take 1 Units by mouth.     • levothyroxine 88 MCG tablet Take 1 tablet by mouth daily. Take 1 tab daily 1 tablet 0   • pantoprazole (PROTONIX) 20 MG tablet Take by mouth daily.        No current facility-administered medications for this visit.       ALLERGY:    ALLERGIES:   Allergen Reactions   • Seasonal Other (See Comments)       REVIEW OF SYSTEMS:    Review of Systems   All other systems reviewed and are negative.          Examination    VITALS:  Vitals:    07/21/22 1346 07/21/22 1403   BP: 139/80 126/72   Pulse: 69    Resp: 17    SpO2: 96%    Weight: 75.8 kg (167 lb)    Height: 5' 3\" (1.6 m)         Body mass index is 29.58 kg/m².      PHYSICAL EXAM F/U:    Physical Exam  Vitals and nursing note reviewed.   Constitutional:       Appearance: She is well-developed.   HENT:      Head: Normocephalic and atraumatic.   Cardiovascular:      Rate and Rhythm: Normal rate and regular rhythm.      Heart sounds: Normal heart sounds.   Pulmonary:      Effort: Pulmonary effort is normal. No respiratory distress.      Breath sounds: Normal breath sounds. No wheezing or rales.   Musculoskeletal:      Cervical back: Normal range of motion and neck supple.   Skin:     General: Skin is  warm and dry.      Findings: No erythema.   Neurological:      Mental Status: She is alert and oriented to person, place, and time.   Psychiatric:         Behavior: Behavior normal.         Thought Content: Thought content normal.         Judgment: Judgment normal.           PATIENT RECORDS:Reviewed.    IMAGING STUDIES:   CLINICAL HISTORY: Pulmonary nodule.     COMPARISON: CT of the heart dated 06/02/2018.     TECHNIQUE: Axial images through the thorax, following 80 mL Omnipaque 350.  Sagittal and coronal reconstructed images.  Automated exposure control utilized.     FINDINGS: No enlarged mediastinal or hilar lymph nodes are seen.  No enlarged axillary lymph nodes are seen.  The thoracic aorta is normal in caliber.  Coronary artery calcification is noted.  There is no evidence of a pericardial effusion.  There is a suggestion of a small hiatal hernia, with possible wall thickening of the distal esophagus.     There is a faint, 3 mm nodule within the right mid lung field anteriorly in the region of the fissure on image 50, series 2, unchanged.  A previously noted small nodular density within the right middle lobe anteriorly appears less prominent compared with the prior study.  A tiny high-density nodule within the lingula may represent a calcified granuloma.  There is linear density within the right middle lobe anteriorly and within the lingula, compatible with atelectasis or scar.  Linear atelectasis or scar is noted within the lower lobes.  There is no evidence of consolidation.  There is no evidence of a pleural effusion.     Several less than 1 cm hypodense foci are noted within the liver, too small to characterize.  There is mild thickening of the adrenal glands bilaterally.  The visualized upper abdomen is otherwise unremarkable.     Degenerative change is present within the spine.     IMPRESSION:  1.  3 mm faint nodule within the right mid lung field in the region of the fissure, similar to the prior study.   In a high-risk patient, follow-up chest CT in 12 months could be obtained.  A previously noted small nodular density within the right middle lobe anteriorly appears less prominent compared with the prior study.  2.  Areas of atelectasis or scar within the right middle lobe, the lingula and lower lobes bilaterally.  There is no evidence of consolidation.  3.  Small hiatal hernia and possible wall thickening of the distal esophagus.  Clinical correlation suggested.  4.  Nonspecific, mild thickening of the adrenal glands.  5.  Less than 1 cm hypodense foci within the liver, too small to characterize.    PULMONARY PARAMETERS:  DATE OF STUDY:  07/16/2018      Dictated By:  Laura Vazquez M.D.      DIAGNOSES:  Chronic obstructive pulmonary disease and obstructive sleep apnea.      FINDINGS:  Spirometry shows FVC of 2.24 L, which is 78% predicted which   increased to 2.44 L, after bronchodilators, a 9% increase.  FEV1 is 1.09 L,   which is 47% predicted which increased to 1.45 L, after bronchodilators, a 34%   increase.  FEV1/FVC ratio is 48.  Lung volume shows total lung capacity of 4.58   L, which is 98% predicted.  Vital capacity is 2.31 L, which is 80% predicted.   Residual volume is 2.27 L which is 128% predicted.  Diffusion capacity is 76%   predicted.  Flow volume loop shows scooping of the expiratory limb.      IMPRESSION:   1.  Pulmonary function test shows severe airflow obstruction with normal lung       volumes and normal diffusion capacity.  There was good response to       bronchodilators.   2.  This PFT is consistent with bronchial asthma.  Please correlate clinically.          Clinton Score     Clinton total score    4          LAB RESULTS:  WBC (K/mcL)   Date Value   09/16/2021 4.1 (L)     RBC (mil/mcL)   Date Value   09/16/2021 4.34     HCT (%)   Date Value   09/16/2021 39.4     HGB (g/dL)   Date Value   09/16/2021 13.9     PLT (K/mcL)   Date Value   09/16/2021 280       Sodium (mmol/L)   Date Value    09/16/2021 143     Potassium (mmol/L)   Date Value   09/16/2021 4.0     Chloride (mmol/L)   Date Value   09/16/2021 108 (H)     Glucose (mg/dL)   Date Value   09/16/2021 90     Calcium (mg/dL)   Date Value   09/16/2021 8.9     Carbon Dioxide (mmol/L)   Date Value   09/16/2021 28     BUN (mg/dL)   Date Value   09/16/2021 16     Creatinine (mg/dL)   Date Value   09/16/2021 0.74       Iron (mcg/dL)   Date Value   09/16/2021 144     TSH (mcUnits/mL)   Date Value   09/16/2021 2.289           Assessment/Plan:  Problem List Items Addressed This Visit        Pulmonary and Pneumonias    COPD (chronic obstructive pulmonary disease) (CMS/[facility])     COPD is clinically compensated.  Without evidence of bronchospasm or exacerbation on exam today.  Continue with Symbicort and Incruse.  Can only tolerate brand Symbicort              Sleep    Obstructive sleep apnea, adult - Primary     Compliant with CPAP device and deriving medical benefit.  Continue with ongoing use.  Due for new machine.  Order placed.  She may require repeating diagnostic sleep study per insurance guidelines.    Follow-up in clinic 30 to 90 days after starting new device           Relevant Orders    SERVICE TO HOME CARE RESPIRATORY THERAPY    SLEEP STUDY HOME 4 CHANNEL PORTABLE UNATTENDED       Symptoms and Signs    Abnormal CT of the chest     repeat CT chest 7/10/2019.  Stable 3 mm right middle lobe fissural nodule.  Stable to decrease scarring and subsegmental atelectasis elsewhere in the lungs.  Small hiatal hernia, left anterior chest wall subcutaneous nodule may represent sebaceous cyst    She is quit smoking greater than 15 years ago.  These nodules have been radiographically stable since 2018.  No further work-up required                 Return in about 6 months (around 1/21/2023).    The patient indicated understanding of the diagnosis and agreed with the plan of care. All questions answered.     Total time spent on today's encounter including pre  chart review, time with patient counseling regarding above issues and documenting was 25 minutes.     Dariana Crocker, CNP   none

## 2022-09-20 NOTE — BRIEF OPERATIVE NOTE - OPERATION/FINDINGS
left ureteral stone impacted broken and pieces removed, left renal stone broken to powder until I could not see any more due to the dust pt in reverse T and then T etc to keep stone where I was breaking it and then to flush out the dust  d/c with cook due to median lobe oozing  stones did not show on flouro

## 2022-09-20 NOTE — ASU PATIENT PROFILE, ADULT - FALL HARM RISK - HARM RISK INTERVENTIONS

## 2022-09-20 NOTE — ASU PATIENT PROFILE, ADULT - NS PREOP UNDERSTANDS INFO
EVENING 60 tablet 3     No current facility-administered medications for this visit. No Known Allergies    Review of Systems   No vomiting, no HA    Vitals:  /80   Pulse 85   Temp 98.4 °F (36.9 °C)   Wt 280 lb (127 kg)   LMP 06/23/2018 (Exact Date)   SpO2 99%   Breastfeeding? No   BMI 49.60 kg/m²     Physical Exam   General:  Well-appearing, NAD, alert, non-toxic  HEENT:  Normocephalic, atraumatic. Pupils equal and round. CHEST/LUNGS: CTAB, no crackles, no wheeze, no rhonchi. Symmetric rise  CARDIOVASCULAR: RRR,  no murmur, no rub  EXTREMETIES: Normal movement of all extremities  SKIN:  No rash, no cellulitis, no bruising, no petechiae/purpura/vesicles/pustules/abscess  PSYCH:  A+O x 3; normal affect. Answers questions appropriately. NEURO:  GCS 15, CN2-12 grossly intact, no focal motor/sensory deficits, no cerebellar deficits, normal gait, normal speech      Assessment/Plan     61-year-old female here for ED follow-up. Her chest pain and shortness of breath seem likely related to her untreated depression. Per patient request we'll restart her on Celexa. Advised patient to call if her mood is not improved in 2 weeks. Discussed relaxation techniques as well as exercise and hobbies. Patient's PCO S is stable, continue metformin. Discussed with patient medication/s dosage, instructions, potential S/E, A/R and Drug Interaction  Educational material provided regarding patient's condition  Tylenol or Motrin as needed for pain or fever  Advise to return here if worse or go to nearest ER  Encourage fluids  Pt discharged in stable condition at 12:08      1. Atypical chest pain      2. Depression, unspecified depression type    - citalopram (CELEXA) 20 MG tablet; TAKE 1 TABLET BY MOUTH EVERY DAY  Dispense: 30 tablet; Refill: 3    3. Polycystic ovarian disease    - metFORMIN (GLUCOPHAGE) 500 MG tablet; TAKE 1 TABLET BY MOUTH ONE TIME A DAY WITH BREAKFAST  Dispense: 90 tablet;  Refill: 3      No orders of the defined types were placed in this encounter. Return if symptoms worsen or fail to improve.     Sugar Walton MD    7/6/2018  12:08 PM yes

## 2022-09-20 NOTE — ASU PREOP CHECKLIST - PATIENT PROBLEMS/NEEDS
Patient expressed no known problems or needs Tremfya Counseling: I discussed with the patient the risks of guselkumab including but not limited to immunosuppression, serious infections, worsening of inflammatory bowel disease and drug reactions.  The patient understands that monitoring is required including a PPD at baseline and must alert us or the primary physician if symptoms of infection or other concerning signs are noted.

## 2022-09-22 ENCOUNTER — APPOINTMENT (OUTPATIENT)
Dept: UROLOGY | Facility: CLINIC | Age: 57
End: 2022-09-22

## 2022-09-22 VITALS
SYSTOLIC BLOOD PRESSURE: 161 MMHG | DIASTOLIC BLOOD PRESSURE: 99 MMHG | HEART RATE: 75 BPM | HEIGHT: 78 IN | BODY MASS INDEX: 35.75 KG/M2 | TEMPERATURE: 97.4 F | WEIGHT: 309 LBS

## 2022-09-22 DIAGNOSIS — N20.1 CALCULUS OF URETER: ICD-10-CM

## 2022-09-22 DIAGNOSIS — N20.0 CALCULUS OF KIDNEY: ICD-10-CM

## 2022-09-22 PROCEDURE — 99072 ADDL SUPL MATRL&STAF TM PHE: CPT

## 2022-09-22 PROCEDURE — 99214 OFFICE O/P EST MOD 30 MIN: CPT

## 2022-09-22 NOTE — LETTER BODY
[Dear  ___] : Dear  [unfilled], [Courtesy Letter:] : I had the pleasure of seeing your patient, [unfilled], in my office today. [Please see my note below.] : Please see my note below. [Sincerely,] : Sincerely, [DrChristopher  ___] : Dr. CHU [FreeTextEntry2] : Preston Jorge MD\par [address] [facility] [address]\[place] [address] \par

## 2022-09-22 NOTE — ASSESSMENT
[FreeTextEntry1] : Stone was impacted and I am unsure if the lower pole stone is completely removed.  We will leave the stent in until next week and have him get a renal ultrasound to see.  If the stone is gone or fragmented we will start him on potassiums citrate and he will undergo cystoscopy to remove the stent.  He should still see nephrology.\par \par Catheter was removed today for trial of void and no signs or symptoms of urinary retention reviewed in detail.\par He knows to follow-up in the office and/or present to emergency room if they occur.\par

## 2022-09-22 NOTE — LETTER HEADER
[FreeTextEntry3] : Antwon Rivers M.D.\par Director of Urology\par [facility]\par [address] [place] [address], [address]\[place] [address]

## 2022-09-22 NOTE — PHYSICAL EXAM
[General Appearance - Well Developed] : well developed [General Appearance - Well Nourished] : well nourished [Normal Appearance] : normal appearance [Well Groomed] : well groomed [General Appearance - In No Acute Distress] : no acute distress [Abdomen Soft] : soft [Abdomen Tenderness] : non-tender [Costovertebral Angle Tenderness] : no ~M costovertebral angle tenderness [Urethral Meatus] : meatus normal [Penis Abnormality] : normal circumcised penis [Urinary Bladder Findings] : the bladder was normal on palpation [Scrotum] : the scrotum was normal [Testes Mass (___cm)] : there were no testicular masses [Edema] : no peripheral edema [] : no respiratory distress [Respiration, Rhythm And Depth] : normal respiratory rhythm and effort [Exaggerated Use Of Accessory Muscles For Inspiration] : no accessory muscle use [Oriented To Time, Place, And Person] : oriented to person, place, and time [Affect] : the affect was normal [Mood] : the mood was normal [Not Anxious] : not anxious [Normal Station and Gait] : the gait and station were normal for the patient's age [No Focal Deficits] : no focal deficits [Testes Tenderness] : no tenderness of the testes [FreeTextEntry1] : Catheter in place draining clear urine no discharge

## 2022-09-22 NOTE — HISTORY OF PRESENT ILLNESS
[Urinary Urgency] : urinary urgency [Urinary Frequency] : urinary frequency [Nocturia] : nocturia [Weak Stream] : weak stream [Erectile Dysfunction] : Erectile Dysfunction [Aching] : aching [Gradual] : gradual [___ Month(s) Ago] : [unfilled] month(s) ago [Intermittent] : intermittently [___ x Week] : occur [unfilled] times a week [Mild] : mild [Improving] : improving [None] : No relieving factors are noted [FreeTextEntry1] : Dieter is a 56-year-old male who we have been following for long history of uric acid renal stones and voiding dysfunction.\par \par He was last seen in April 2021 where we had him managed on potassiums citrate and recommend he see a nephrologist to discuss optimization.  He did not follow-up quite some time over a year, wound up in the emergency room with an obstructing 4.4 x 8 mm left mid ureteral calculus, with Hounsfield units of 506, and a 1.5 x 0.7 x 0.8 cm lower pole left renal calculi.\par \par Stent was placed 8/30/2022 and he underwent left ureteroscopy with laser lithotripsy of his ureteral stone and lower pole stone, with stent placement, on 9/20/2022.  He was supposed to go earlier but there was a death in the family.  A Mckinley catheter was placed after the procedure secondary to median lobe oozing.  \par \par He is doing well postprocedure and presents today for trial of void.  Reports that he had some blood in the bag postprocedure however, it is cleared up.  He is increasing p.o. fluid.\par \par Of note, he was on potassium citrate in the past however, discontinued this medication and did not follow-up with us or nephrology.\par \par  [de-identified] : Right groin pain without radiation ranges from a twinge up to 5 on a scale of 1-10

## 2022-09-24 LAB — NIDUS STONE QN: SIGNIFICANT CHANGE UP

## 2022-09-29 ENCOUNTER — APPOINTMENT (OUTPATIENT)
Dept: UROLOGY | Facility: CLINIC | Age: 57
End: 2022-09-29

## 2022-09-29 VITALS
SYSTOLIC BLOOD PRESSURE: 161 MMHG | HEIGHT: 78 IN | HEART RATE: 65 BPM | TEMPERATURE: 98.1 F | BODY MASS INDEX: 36.33 KG/M2 | WEIGHT: 314 LBS | DIASTOLIC BLOOD PRESSURE: 87 MMHG

## 2022-09-29 DIAGNOSIS — N20.1 CALCULUS OF URETER: ICD-10-CM

## 2022-09-29 DIAGNOSIS — N20.0 CALCULUS OF KIDNEY: ICD-10-CM

## 2022-09-29 LAB
BILIRUB UR QL STRIP: NORMAL
COLLECTION METHOD: NORMAL
GLUCOSE UR-MCNC: NORMAL
HCG UR QL: 0.2 EU/DL
HGB UR QL STRIP.AUTO: NORMAL
KETONES UR-MCNC: NORMAL
LEUKOCYTE ESTERASE UR QL STRIP: NORMAL
NITRITE UR QL STRIP: NORMAL
PH UR STRIP: 5.5
PROT UR STRIP-MCNC: 100
SP GR UR STRIP: 1.02

## 2022-09-29 PROCEDURE — 81003 URINALYSIS AUTO W/O SCOPE: CPT | Mod: QW

## 2022-09-29 PROCEDURE — 99072 ADDL SUPL MATRL&STAF TM PHE: CPT

## 2022-09-29 PROCEDURE — 99214 OFFICE O/P EST MOD 30 MIN: CPT | Mod: 25

## 2022-09-29 PROCEDURE — 52310 CYSTOSCOPY AND TREATMENT: CPT

## 2022-09-29 NOTE — HISTORY OF PRESENT ILLNESS
[FreeTextEntry1] : Child is a 56-year-old male born November 26, 1965 last seen September 22, 2022 after extended left ureteroscopy in September 28, 2022.  We are left a Mckinley catheter because of his large median lobe and oozing from the bladder neck and some swelling I was concerned might have been.  That was taken out on the 22nd.  He was post to go for renal ultrasound so we can see what with the stone burden on the left side and follow-up today for review possible cystoscopy with removal of the stent.  He was post to restart the potassiums citrate on monitoring his urine pH.  He tells me since last seen he ____________________________ \par \par The ultrasound was done at [facility] in September 22, 2022 and he still has a lot of of the lower pole is now measuring 10.2 x 4.6 x 9.7.  When I look at the film even on ultrasound I can see numerous small Karanikolas I think this is just the stone broken up and it is just sitting in there.  Hopefully either by positioning or by alkalinization of his urine the stones will pass will dissolve

## 2022-09-29 NOTE — ASSESSMENT
[FreeTextEntry1] : He has a large stone burden but it looks like a lot of small pieces at least by ultrasound and I do not think it is worth getting a CAT scan.  He will keep his pH up about 6.5 in the meantime I will take out the stent knowing that he can pass a lot of stone and if he gets clogged we will worry about it then.\par \par Please note during the cystoscopy I showed him the trilobar hypertrophy with a very large median lobe.  He also had a lot of encrustation on the stent already with the distal coil almost annealed onto itself.  I was able to pop it open with the grasper but it was already encrusting.  The stent was given to him to take home.\par \par As far as prevention we will get a metabolic work-up in about 6 weeks and see him in 2 months with an ultrasound before he comes back in.  He will get a urine culture next week after 3-day course of Macrobid starting today and he will start on the potassium citrate he has already ordered the pH paper

## 2022-09-29 NOTE — PHYSICAL EXAM
[General Appearance - Well Developed] : well developed [General Appearance - Well Nourished] : well nourished [Normal Appearance] : normal appearance [Well Groomed] : well groomed [General Appearance - In No Acute Distress] : no acute distress [Abdomen Soft] : soft [Abdomen Tenderness] : non-tender [Abdomen Hernia] : no hernia was discovered [Costovertebral Angle Tenderness] : no ~M costovertebral angle tenderness [Urethral Meatus] : meatus normal [Penis Abnormality] : normal circumcised penis [Testes Tenderness] : no tenderness of the testes [Edema] : no peripheral edema [] : no respiratory distress [Respiration, Rhythm And Depth] : normal respiratory rhythm and effort [Exaggerated Use Of Accessory Muscles For Inspiration] : no accessory muscle use [Normal Station and Gait] : the gait and station were normal for the patient's age [No Focal Deficits] : no focal deficits

## 2022-09-29 NOTE — LETTER HEADER
[FreeTextEntry3] : Antwon Rivers M.D.\par Director of Urology\par [facility]\par [address] [place] [address], [address]\[place] [address]

## 2022-09-29 NOTE — LETTER BODY
[Dear  ___] : Dear  [unfilled], [Courtesy Letter:] : I had the pleasure of seeing your patient, [unfilled], in my office today. [Please see my note below.] : Please see my note below. [Sincerely,] : Sincerely, [FreeTextEntry2] : Preston Jorge MD\par [address]\[place] [address] \par  [DrChristopher  ___] : Dr. CHU

## 2022-11-28 ENCOUNTER — APPOINTMENT (OUTPATIENT)
Dept: UROLOGY | Facility: CLINIC | Age: 57
End: 2022-11-28

## 2022-11-28 VITALS
OXYGEN SATURATION: 99 % | WEIGHT: 314 LBS | HEIGHT: 78 IN | DIASTOLIC BLOOD PRESSURE: 82 MMHG | HEART RATE: 65 BPM | TEMPERATURE: 98 F | SYSTOLIC BLOOD PRESSURE: 152 MMHG | RESPIRATION RATE: 14 BRPM | BODY MASS INDEX: 36.33 KG/M2

## 2022-11-28 DIAGNOSIS — R68.82 DECREASED LIBIDO: ICD-10-CM

## 2022-11-28 DIAGNOSIS — R33.9 RETENTION OF URINE, UNSPECIFIED: ICD-10-CM

## 2022-11-28 DIAGNOSIS — R39.198 OTHER DIFFICULTIES WITH MICTURITION: ICD-10-CM

## 2022-11-28 DIAGNOSIS — R35.0 FREQUENCY OF MICTURITION: ICD-10-CM

## 2022-11-28 PROCEDURE — 99072 ADDL SUPL MATRL&STAF TM PHE: CPT

## 2022-11-28 PROCEDURE — 99214 OFFICE O/P EST MOD 30 MIN: CPT

## 2022-11-28 RX ORDER — NITROFURANTOIN (MONOHYDRATE/MACROCRYSTALS) 25; 75 MG/1; MG/1
100 CAPSULE ORAL TWICE DAILY
Qty: 6 | Refills: 0 | Status: COMPLETED | COMMUNITY
Start: 2022-09-29 | End: 2022-11-28

## 2022-11-28 RX ORDER — ESCITALOPRAM OXALATE 10 MG/1
10 TABLET ORAL
Qty: 30 | Refills: 0 | Status: ACTIVE | COMMUNITY
Start: 2022-10-09

## 2022-11-28 RX ORDER — ALPRAZOLAM 0.25 MG/1
0.25 TABLET ORAL
Qty: 60 | Refills: 0 | Status: ACTIVE | COMMUNITY
Start: 2022-10-11

## 2022-11-28 RX ORDER — TAMSULOSIN HYDROCHLORIDE 0.4 MG/1
0.4 CAPSULE ORAL
Qty: 90 | Refills: 1 | Status: COMPLETED | COMMUNITY
Start: 2019-05-17 | End: 2022-11-28

## 2022-11-28 NOTE — HISTORY OF PRESENT ILLNESS
[FreeTextEntry1] : Dieter is a 57-year-old man born November 26, 1965 who had extended left ureteroscopy in September 2022 because of the large median lobe I left a Mckinley catheter that was taken out September 22 and I saw him again on September 29.  He has been on the potassiums citrate taking 6 tablets a day and even with 6 tablets a day he can get his pH above 6.  He was post to get a metabolic work-up and a repeat ultrasound and depending on what we find we may get a CAT scan.\par \par He did a metabolic work-up x1 November 4\par He did urinalysis and culture on October 26\par He did an ultrasound on November 15\par \par He tells me he is drinking at least 3 L a day he is urinating acceptably and he feels good but he is apprehensive about what the studies are going to show.\par \par He got sildenafil from an ongoing "medical side" he is using 60 mg of sildenafil every few weeks it works but not as good as he thought it would be and that even with that his erections are not as good as he and his wife would like. [Erectile Dysfunction] : Erectile Dysfunction

## 2022-11-28 NOTE — LETTER BODY
[Dear  ___] : Dear  [unfilled], [Courtesy Letter:] : I had the pleasure of seeing your patient, [unfilled], in my office today. [Please see my note below.] : Please see my note below. [Sincerely,] : Sincerely, [FreeTextEntry2] : Preston Jorge MD\par [address] [facility]\[place] [address] \par  [DrChristopher  ___] : Dr. CHU

## 2022-11-28 NOTE — ASSESSMENT
[FreeTextEntry1] : He has a 10 mm stone on the left lower pole, his stone was 100% uric acid and he saw a photo where I have cleaned out the lower pole.  He is already formed a new stone and I am not sure why other than he is having way too much uric acid whether it is from animal protein or other sources such as yeast bread or certain nuts, I do not know.  I think he needs to see a nephrologist as #1 I do not believe he is getting keep up a full liter urine output and with a uric acid of 1.35 and he is ready for dinner stone he will be back in the operating room.  I cannot give him more citrate he is already on almost 2000 and even with that his pH is barely acceptable.\par \par As far as his voiding I will have him keep a record of his intake and output when he comes back in we will look at the results and consider a flow study\par \par As far as his erectile dysfunction I will get hormones as he says has not been checked, I understand his online place gave him sildenafil without an appropriate work-up I am going to want Maurepas criteria classification is on the little more anal compulsive and I have urologic practice rather than a business selling my anger.

## 2022-11-28 NOTE — LETTER HEADER
[FreeTextEntry3] : Antwon Rivers M.D.\par Director Emeritus of Urology\par [facility]\par [address] [place] [address], [address]\[place] [address]

## 2023-01-30 NOTE — PRE-OP CHECKLIST - NS PREOP CHK MONITOR ANESTHESIA CONSENT
Lab Result Notifications    If labs were ordered at your appointment today, we will contact you with results once all your labs have resulted. Please note certain hormone labs can take up to 10 business days to result.     Prescription Policy     Our goal is to serve you on a more timely basis. Currently, our office receives a large volume of phone requests for medication refills. We are requesting your cooperation with the following:    Look over your medications, diabetic supplies, etc. before coming to your appointment to see if you need any refills.    Request your medication (or supply) refills during your office visit.    Outside of an office visit, requests should be directed to your pharmacy even if you have zero refills. Call your pharmacy after 72 hours to see if your prescription is ready for pick-up.     Pharmacy Refills: All prescriptions take 48-72 hours to refill.                                              Our Patients are Important      Our goal is for your appointment to start at your appointment time.     Please arrive 15 minutes early to allow our staff adequate time to prepare you for your visit to meet with your provider at the scheduled appointment time.     Additionally, if you need to cancel or change your appointment our clinic requests 24 - 48 hours notice, to allow us to refill that open appointment. Thank you.    We want to improve and you can help. You may receive a survey asking you about this visit. Please complete this survey; we will use your feedback to make improvements. Thank you.     done

## 2023-03-09 ENCOUNTER — APPOINTMENT (OUTPATIENT)
Dept: UROLOGY | Facility: CLINIC | Age: 58
End: 2023-03-09
Payer: COMMERCIAL

## 2023-03-09 VITALS
HEART RATE: 75 BPM | HEIGHT: 78 IN | DIASTOLIC BLOOD PRESSURE: 87 MMHG | BODY MASS INDEX: 36.33 KG/M2 | WEIGHT: 314 LBS | SYSTOLIC BLOOD PRESSURE: 134 MMHG

## 2023-03-09 DIAGNOSIS — N52.9 MALE ERECTILE DYSFUNCTION, UNSPECIFIED: ICD-10-CM

## 2023-03-09 DIAGNOSIS — N32.81 OVERACTIVE BLADDER: ICD-10-CM

## 2023-03-09 DIAGNOSIS — R35.1 NOCTURIA: ICD-10-CM

## 2023-03-09 DIAGNOSIS — R79.89 OTHER SPECIFIED ABNORMAL FINDINGS OF BLOOD CHEMISTRY: ICD-10-CM

## 2023-03-09 PROCEDURE — 99214 OFFICE O/P EST MOD 30 MIN: CPT

## 2023-03-09 PROCEDURE — 51741 ELECTRO-UROFLOWMETRY FIRST: CPT

## 2023-03-09 PROCEDURE — 51798 US URINE CAPACITY MEASURE: CPT

## 2023-03-09 PROCEDURE — 99072 ADDL SUPL MATRL&STAF TM PHE: CPT

## 2023-03-09 NOTE — LETTER HEADER
[FreeTextEntry3] : Antwon Rivers M.D.\par Director Emeritus of Urology\par [facility]\par [address] [place] [address], [address]\[place] [address]

## 2023-03-09 NOTE — HISTORY OF PRESENT ILLNESS
[Erectile Dysfunction] : Erectile Dysfunction [FreeTextEntry1] : Dieter is a 57-year-old male, born 1965, last seen on 11/28/2022, with history of BPH and renal stones, status post ureteroscopy back in 2022, currently managed on potassiums citrate.\par \par At his last visit he was complaining of minimally bothersome lower urinary tract symptoms with erectile dysfunction and decreased libido. \par \par He presents today to review his hormone panel, voiding diary, and if indicated proceed to a uroflow study.\par \par \par

## 2023-03-09 NOTE — ASSESSMENT
[FreeTextEntry1] : His uroflow study is well within normal limits with PVR of 86.  His voiding diary demonstrates increased p.o. fluid with equivalent frequency, product of p.o. fluid intake.  At this time he is electing for observation.  We discussed conservative management including decreasing p.o. fluid in the evening and raising his legs prior to bedtime.\par \par Concerning his PSA value, it is 11.5 which is significantly elevated from his prior value of 1.1 in 2019.  He states that he has PSA values from his PCP and they were always within normal limits.  He we will get us a copy of those records.  He will obtain repeat PSA follow-up and review.\par \par He has significantly low testosterone and we will repeat this value with his PSA, though we will clarify the PSA issue prior to  TRT even if indicated.

## 2023-03-09 NOTE — LETTER BODY
[Dear  ___] : Dear  [unfilled], [Courtesy Letter:] : I had the pleasure of seeing your patient, [unfilled], in my office today. [Please see my note below.] : Please see my note below. [Sincerely,] : Sincerely, [DrChristopher  ___] : Dr. CHU [FreeTextEntry2] : Preston Jorge MD\par [address] [facility]\[place] [address] \par

## 2023-03-09 NOTE — END OF VISIT
[FreeTextEntry3] : I, Dr. Rivers, personally performed the evaluation and management (E/M) services for this established patient who presents today with (a) new problem(s)/exacerbation of (an) existing condition(s).  That E/M includes conducting the examination, assessing all new/exacerbated conditions, and establishing a new plan of care.  Today, my ACP, Juan Diego Menchaca was here to observe my evaluation and management services for this new problem/exacerbated condition to be followed going forward.  [>50% of the face to face encounter time was spent on counseling and/or coordination of care for ___] : Greater than 50% of the face to face encounter time was spent on counseling and/or coordination of care for [unfilled]

## 2023-03-20 ENCOUNTER — APPOINTMENT (OUTPATIENT)
Dept: UROLOGY | Facility: CLINIC | Age: 58
End: 2023-03-20
Payer: COMMERCIAL

## 2023-03-20 VITALS
BODY MASS INDEX: 34.48 KG/M2 | HEART RATE: 85 BPM | HEIGHT: 78 IN | RESPIRATION RATE: 18 BRPM | TEMPERATURE: 98.5 F | OXYGEN SATURATION: 98 % | SYSTOLIC BLOOD PRESSURE: 152 MMHG | DIASTOLIC BLOOD PRESSURE: 95 MMHG | WEIGHT: 298 LBS

## 2023-03-20 DIAGNOSIS — N20.0 CALCULUS OF KIDNEY: ICD-10-CM

## 2023-03-20 DIAGNOSIS — R97.20 ELEVATED PROSTATE, SPECIFIC ANTIGEN [PSA]: ICD-10-CM

## 2023-03-20 PROCEDURE — 99214 OFFICE O/P EST MOD 30 MIN: CPT

## 2023-03-20 PROCEDURE — 99072 ADDL SUPL MATRL&STAF TM PHE: CPT

## 2023-03-20 RX ORDER — ALPRAZOLAM 0.5 MG/1
0.5 TABLET ORAL
Qty: 30 | Refills: 0 | Status: ACTIVE | COMMUNITY
Start: 2023-03-14

## 2023-03-20 NOTE — ASSESSMENT
[FreeTextEntry1] : His PSA even up with the second value though lower than the last one it is still significantly elevated him a short time.  We need a BMP as I do am going to send him for MRI of the prostate.\par \par As far as his hormones will await and see what is happening with his prostate before we discussed testosterone replacement therapy\par \par As far as his stones his last ultrasound in November showed a 10 mm stone we will go to get an ultrasound and KUB so we know where we are holding with that as well

## 2023-03-20 NOTE — LETTER HEADER
[FreeTextEntry3] : Antwon Rivers M.D.\par Director Emeritus of Urology\par [facility]\par [address] [place] [address], [address]\[place] [address]

## 2023-03-20 NOTE — LETTER BODY
[Dear  ___] : Dear  [unfilled], [Courtesy Letter:] : I had the pleasure of seeing your patient, [unfilled], in my office today. [Please see my note below.] : Please see my note below. [Sincerely,] : Sincerely, [FreeTextEntry2] : Preston Jorge MD\par [address] [facility]\[place] [address] \par  [DrChristopher  ___] : Dr. CHU

## 2023-03-20 NOTE — HISTORY OF PRESENT ILLNESS
[FreeTextEntry1] : Dieter is a 57-year-old male last seen March 20, 2023 with a history of BPH, renal stones, ureteroscopy in 2022 managed on potassiums citrate.\par \par He has voiding dysfunction in the uroflow was acceptable with the increased frequency related to increased p.o. intake and we elected to try cutting back fluid in the evening so he does not wake up at night with the behavior modification to raise his legs.  He has done that and that helped he still wakes but not as often\par \par His PSA had come back at 11.5 and his hormones with a very low free and bioavailable at 36 and 76.  We will going to repeat these and he was going to bring me the PSA values from his PCP.\par 438

## 2023-03-30 ENCOUNTER — RX RENEWAL (OUTPATIENT)
Age: 58
End: 2023-03-30

## 2023-03-30 RX ORDER — POTASSIUM CITRATE 10 MEQ/1
10 MEQ TABLET, EXTENDED RELEASE ORAL
Qty: 540 | Refills: 1 | Status: ACTIVE | COMMUNITY
Start: 2017-10-09 | End: 1900-01-01

## 2023-04-01 ENCOUNTER — RESULT REVIEW (OUTPATIENT)
Age: 58
End: 2023-04-01

## 2023-04-01 ENCOUNTER — OUTPATIENT (OUTPATIENT)
Dept: OUTPATIENT SERVICES | Facility: HOSPITAL | Age: 58
LOS: 1 days | End: 2023-04-01
Payer: COMMERCIAL

## 2023-04-01 DIAGNOSIS — Z98.890 OTHER SPECIFIED POSTPROCEDURAL STATES: Chronic | ICD-10-CM

## 2023-04-01 DIAGNOSIS — Z90.5 ACQUIRED ABSENCE OF KIDNEY: Chronic | ICD-10-CM

## 2023-04-01 DIAGNOSIS — Z00.8 ENCOUNTER FOR OTHER GENERAL EXAMINATION: ICD-10-CM

## 2023-04-01 DIAGNOSIS — N20.0 CALCULUS OF KIDNEY: Chronic | ICD-10-CM

## 2023-04-01 DIAGNOSIS — R97.20 ELEVATED PROSTATE SPECIFIC ANTIGEN [PSA]: ICD-10-CM

## 2023-04-01 DIAGNOSIS — Z90.49 ACQUIRED ABSENCE OF OTHER SPECIFIED PARTS OF DIGESTIVE TRACT: Chronic | ICD-10-CM

## 2023-04-01 DIAGNOSIS — Z85.528 PERSONAL HISTORY OF OTHER MALIGNANT NEOPLASM OF KIDNEY: Chronic | ICD-10-CM

## 2023-04-01 PROCEDURE — A9579: CPT

## 2023-04-01 PROCEDURE — 72197 MRI PELVIS W/O & W/DYE: CPT

## 2023-04-01 PROCEDURE — 72197 MRI PELVIS W/O & W/DYE: CPT | Mod: 26

## 2023-04-02 DIAGNOSIS — R97.20 ELEVATED PROSTATE SPECIFIC ANTIGEN [PSA]: ICD-10-CM

## 2023-05-16 ENCOUNTER — APPOINTMENT (OUTPATIENT)
Dept: UROLOGY | Facility: CLINIC | Age: 58
End: 2023-05-16
Payer: COMMERCIAL

## 2023-05-16 DIAGNOSIS — N13.8 BENIGN PROSTATIC HYPERPLASIA WITH LOWER URINARY TRACT SYMPMS: ICD-10-CM

## 2023-05-16 DIAGNOSIS — R97.20 ELEVATED PROSTATE, SPECIFIC ANTIGEN [PSA]: ICD-10-CM

## 2023-05-16 DIAGNOSIS — N40.1 BENIGN PROSTATIC HYPERPLASIA WITH LOWER URINARY TRACT SYMPMS: ICD-10-CM

## 2023-05-16 PROCEDURE — 99214 OFFICE O/P EST MOD 30 MIN: CPT

## 2023-05-16 NOTE — END OF VISIT
[FreeTextEntry3] : I participated in obtaining the history, formulated treatment plan which I discussed with the patient and agree with the above transcription by the physicians assistant

## 2023-05-16 NOTE — ASSESSMENT
[FreeTextEntry1] : Dieter is a 57-year-old referred for transperineal prostate biopsy.\par MRI findings and PSA density was reviewed with patient in detail.\par His most recent PSAs were also reviewed.\par \par Transperineal prostate biopsy was reviewed with patient in detail.  Difference between transrectal approach and transperineal approach were reviewed.  Risks of bleeding, infection, and rare risks of difficulty urinating were reviewed with patient.\par Patient states he is not taking any blood thinners.\par Patient verbalized understanding of risk and he is agreeable to schedule.\par \par Given his PSA is drastic decrease from 11-8 in less than a month, we will repeat patient's PSA and assess if PSA is returning to patient's baseline.  If PSA is returning to patient's baseline can cancel biopsy.\par \par

## 2023-05-16 NOTE — HISTORY OF PRESENT ILLNESS
[FreeTextEntry1] : Dieter is a 57-year-old male who was referred by Dr. Olivarez for prostate biopsy due to elevated PSA.  In September 2022 his PSA was 2.2 ng/mL in 2019 his PSA was 1.7 ng/mL.  Patient had a PSA in February 2023 of 11.5 ng/mL and repeat 1 month later of 8.7 ng/mL.  MRI of prostate done April 2023 revealed no suspicious prostatic lesions, PI-RADS 2.  Prostate size of 53 cc.  Current PSA density 0.16 which is elevated

## 2023-05-22 LAB
PSA FREE FLD-MCNC: 19 %
PSA FREE SERPL-MCNC: 0.63 NG/ML
PSA SERPL-MCNC: 3.23 NG/ML

## 2023-06-14 ENCOUNTER — APPOINTMENT (OUTPATIENT)
Dept: UROLOGY | Facility: CLINIC | Age: 58
End: 2023-06-14

## 2023-06-19 ENCOUNTER — APPOINTMENT (OUTPATIENT)
Dept: UROLOGY | Facility: HOSPITAL | Age: 58
End: 2023-06-19

## 2023-06-29 NOTE — ED PROVIDER NOTE - PRINCIPAL DIAGNOSIS
The patient has received a copy of the Provation Report the doctor has written and was discussed with the patient and given to primary RN prior to returning to the inpatient floor.  All questions were answered and patient has provider's office phone number for further questions or concerns.    Kidney stone

## 2023-10-19 ENCOUNTER — NON-APPOINTMENT (OUTPATIENT)
Age: 58
End: 2023-10-19

## 2023-10-24 NOTE — ED ADULT NURSE NOTE - NSIMPLEMENTINTERV_GEN_ALL_ED
No Implemented All Universal Safety Interventions:  [place] to call system. Call bell, personal items and telephone within reach. Instruct patient to call for assistance. Room bathroom lighting operational. Non-slip footwear when patient is off stretcher. Physically safe environment: no spills, clutter or unnecessary equipment. Stretcher in lowest position, wheels locked, appropriate side rails in place.

## 2023-11-22 ENCOUNTER — APPOINTMENT (OUTPATIENT)
Dept: UROLOGY | Facility: CLINIC | Age: 58
End: 2023-11-22

## 2023-12-05 ENCOUNTER — RX RENEWAL (OUTPATIENT)
Age: 58
End: 2023-12-05

## 2023-12-18 ENCOUNTER — RX RENEWAL (OUTPATIENT)
Age: 58
End: 2023-12-18

## 2024-01-23 ENCOUNTER — RX RENEWAL (OUTPATIENT)
Age: 59
End: 2024-01-23

## 2024-02-07 ENCOUNTER — APPOINTMENT (OUTPATIENT)
Dept: PULMONOLOGY | Facility: CLINIC | Age: 59
End: 2024-02-07
Payer: COMMERCIAL

## 2024-02-07 VITALS
OXYGEN SATURATION: 97 % | SYSTOLIC BLOOD PRESSURE: 139 MMHG | HEART RATE: 68 BPM | HEIGHT: 78 IN | BODY MASS INDEX: 34.83 KG/M2 | WEIGHT: 301 LBS | DIASTOLIC BLOOD PRESSURE: 79 MMHG

## 2024-02-07 DIAGNOSIS — E66.9 OBESITY, UNSPECIFIED: ICD-10-CM

## 2024-02-07 DIAGNOSIS — G47.30 SLEEP APNEA, UNSPECIFIED: ICD-10-CM

## 2024-02-07 PROCEDURE — 99203 OFFICE O/P NEW LOW 30 MIN: CPT

## 2024-02-07 RX ORDER — SERTRALINE HYDROCHLORIDE 25 MG/1
25 TABLET, FILM COATED ORAL
Refills: 0 | Status: COMPLETED | COMMUNITY
End: 2024-02-07

## 2024-02-07 NOTE — ASSESSMENT
[FreeTextEntry1] : Assessment: KENDY he seems to be opening his mouth at night causing an air leak Obesity EDS controlled   PLAN: The patient is benefitting from the PAP device.  He needs to get a new CPAP unit. He will need a new fullface mask.  Hybrid mask was fitted to him today while in the office. New supplies will be ordered when required. Weight loss maintenance discussed. I stressed the need maintain compliance with the PAP device. The patient is not to use an Ozone or UV sterilizer. F/U in 6 months

## 2024-02-07 NOTE — REASON FOR VISIT
[Sleep Apnea] : sleep apnea [TextBox_44] : Patient has a history of sleep apnea.  He has been on CPAP for many years.  He was lost to follow-up.  Recently he started having a lot of difficulty with the machine.  He is currently using the Garsia CPAP mask.  His mouth is very dry and the water in the humidifier is dropping dramatically.  As this has progressed since he started to get more restlessness at night and therefore tiredness during the day.  The patient's CPAP machine is also very old.

## 2024-02-15 ENCOUNTER — RX RENEWAL (OUTPATIENT)
Age: 59
End: 2024-02-15

## 2024-09-19 NOTE — H&P PST ADULT - ALCOHOL USE HISTORY SINGLE SELECT
[Abdominal Pain] : abdominal pain [Diarrhea] : diarrhea [Depression] : depression [Negative] : Heme/Lymph [Insomnia] : insomnia never

## 2025-01-13 NOTE — ASU PATIENT PROFILE, ADULT - TEACHING/LEARNING FACTORS INFLUENCE READINESS TO LEARN
"0235 pt reports seeing blood in rectum area when wiping, Pt describes blood as bright red and a good amount noted. Educated pt on importance of showing the blood from rectum. Pt states," this is the second time I have done it when I went to the bathroom. 0241 Contacted on call ob/gyn Dr. Huerta and informed her of the bleeding coming from the rectum. No new orders given at this time. Dr. Huerta states, " she will let the rounding physician aware of the bleeding going on." Will cont. To monitor pt. Pt verbalizes understanding to show if sign of blood is with next bowel movement.  " none